# Patient Record
Sex: FEMALE | Race: BLACK OR AFRICAN AMERICAN | NOT HISPANIC OR LATINO | Employment: FULL TIME | URBAN - METROPOLITAN AREA
[De-identification: names, ages, dates, MRNs, and addresses within clinical notes are randomized per-mention and may not be internally consistent; named-entity substitution may affect disease eponyms.]

---

## 2019-02-08 ENCOUNTER — TELEPHONE (OUTPATIENT)
Dept: OBSTETRICS AND GYNECOLOGY | Age: 29
End: 2019-02-08

## 2019-04-01 ENCOUNTER — OFFICE VISIT (OUTPATIENT)
Dept: OBSTETRICS AND GYNECOLOGY | Age: 29
End: 2019-04-01

## 2019-04-01 VITALS
DIASTOLIC BLOOD PRESSURE: 90 MMHG | HEIGHT: 68 IN | SYSTOLIC BLOOD PRESSURE: 132 MMHG | BODY MASS INDEX: 44.41 KG/M2 | WEIGHT: 293 LBS

## 2019-04-01 DIAGNOSIS — E66.01 CLASS 3 SEVERE OBESITY DUE TO EXCESS CALORIES WITHOUT SERIOUS COMORBIDITY WITH BODY MASS INDEX (BMI) OF 45.0 TO 49.9 IN ADULT (HCC): ICD-10-CM

## 2019-04-01 DIAGNOSIS — Z11.3 SCREEN FOR STD (SEXUALLY TRANSMITTED DISEASE): ICD-10-CM

## 2019-04-01 DIAGNOSIS — Z72.0 TOBACCO ABUSE: ICD-10-CM

## 2019-04-01 DIAGNOSIS — L68.0 HIRSUTISM: ICD-10-CM

## 2019-04-01 DIAGNOSIS — N93.9 ABNORMAL UTERINE BLEEDING: Primary | ICD-10-CM

## 2019-04-01 DIAGNOSIS — Z12.4 SCREENING FOR MALIGNANT NEOPLASM OF CERVIX: ICD-10-CM

## 2019-04-01 DIAGNOSIS — E28.2 PCOS (POLYCYSTIC OVARIAN SYNDROME): ICD-10-CM

## 2019-04-01 DIAGNOSIS — Z00.00 HEALTHCARE MAINTENANCE: ICD-10-CM

## 2019-04-01 PROCEDURE — 99385 PREV VISIT NEW AGE 18-39: CPT | Performed by: OBSTETRICS & GYNECOLOGY

## 2019-04-01 PROCEDURE — 99213 OFFICE O/P EST LOW 20 MIN: CPT | Performed by: OBSTETRICS & GYNECOLOGY

## 2019-04-01 RX ORDER — ESCITALOPRAM OXALATE 10 MG/1
20 TABLET ORAL DAILY
COMMUNITY
End: 2019-08-20

## 2019-04-01 NOTE — ASSESSMENT & PLAN NOTE
I discussed with her the likely diagnosis of polycystic ovarian syndrome, I recommended lifestyle modifications including weight loss.  Recommended referral to dietitian today but she declines.  Also discussed oral contraceptives as a first-line treatment for PCOS and the rationale.  She declined starting oral contraceptive at this time.  She states we will get the labs today, will follow-up in about a month and will consider starting oral contraceptive

## 2019-04-01 NOTE — PROGRESS NOTES
Routine Annual Visit    2019    Patient: Franny Boudreaux          MR#:[id number]      Chief Complaint   Patient presents with   • Gynecologic Exam     New pt. last pap 2013 per pt. c/o abnormal bleeding        History of Present Illness    28 y.o. female  who presents for annual exam but also for abnormal uterine bleeding and hirsutism.  She knows that she will usually skip a couple of months and will have a heavy period that can last about a week.  More recently she has had more prolonged spotting during the months of February and March.  She also notes trouble some hair growing on her chin that she has to frequently remove.  She has gained a lot of weight recently.  She notes over the past year or so, she is gained about 60 pounds.  She is trying to exercise, but cannot seem to lose any weight.  She has not made significant dietary modifications    Health Maintenance  Gardasil yes  Last pap , normal per pt  Mammogram NA  Colonoscopy NA  PCP none currently    Patient's last menstrual period was 2019 (exact date).  Obstetric History:  OB History      Para Term  AB Living    0 0 0 0 0 0    SAB TAB Ectopic Molar Multiple Live Births    0 0 0 0 0 0         Menstrual History:     Patient's last menstrual period was 2019 (exact date).       Sexual History:     Not currently active  ________________________________________  Patient Active Problem List   Diagnosis   • Class 3 severe obesity due to excess calories without serious comorbidity in adult (CMS/HCC)   • Abnormal uterine bleeding   • Hirsutism   • PCOS (polycystic ovarian syndrome)   • Tobacco abuse       Past Medical History:   Diagnosis Date   • Abnormal vaginal bleeding    • Anxiety     lexapro by Jody   • Depression        Family History   Problem Relation Age of Onset   • Hypertension Maternal Grandmother    • Diabetes Maternal Grandmother    • Breast cancer Neg Hx    • Ovarian cancer Neg Hx    • Uterine cancer Neg Hx   "  • Colon cancer Neg Hx        Past Surgical History:   Procedure Laterality Date   • NO PAST SURGERIES         Social History     Tobacco Use   Smoking Status Current Every Day Smoker   • Packs/day: 0.25   • Years: 15.00   • Pack years: 3.75   • Types: Cigarettes   Smokeless Tobacco Never Used       has a current medication list which includes the following prescription(s): escitalopram, benzonatate, doxycycline, guaifenesin-codeine, and proair hfa.  ________________________________________    Current contraception: none  History of abnormal Pap smear: no  Family history of Breast, ovarian, uterine, colon, pancreatic cancer: no  History of abnormal mammogram: no    The following portions of the patient's history were reviewed and updated as appropriate: allergies, current medications, past family history, past medical history, past social history, past surgical history and problem list.    Review of Systems   Respiratory: Negative for chest tightness, shortness of breath and wheezing.    Cardiovascular: Negative for chest pain and palpitations.   Gastrointestinal: Positive for abdominal distention and constipation. Negative for abdominal pain and diarrhea.   Genitourinary: Positive for menstrual problem and vaginal discharge.   Neurological: Negative for dizziness and light-headedness.   Psychiatric/Behavioral: Positive for dysphoric mood. The patient is nervous/anxious.    no nipple discharge    Objective   Physical Exam    /90   Ht 172.7 cm (68\")   Wt (!) 142 kg (313 lb)   LMP 03/17/2019 (Exact Date)   Breastfeeding? No   BMI 47.59 kg/m²    BP Readings from Last 3 Encounters:   04/01/19 132/90   12/12/17 (!) 147/107      Wt Readings from Last 3 Encounters:   04/01/19 (!) 142 kg (313 lb)         BMI: Body mass index is 47.59 kg/m².       General:   alert, appears stated age and cooperative   skin Acanthosis nigricans on back of neck, also evidence of hair removal on chin   Heart:: regular rate and " rhythm, S1, S2 normal, no murmur, click, rub or gallop   Lungs: normal respiratory effort and auscultation   Abdomen: soft, non-tender, without masses or organomegaly and protuberant   Breast: inspection negative, no nipple discharge or bleeding, no masses or nodularity palpable   Urethra and bladder: urethral meatus normal; bladder nontender to palpation;   Vulva: normal, Bartholin's, Urethra, [place]'s normal   Vagina: normal mucosa, normal discharge   Cervix: no lesions,, slightly friable and some bleeding following pap   Uterus: normal size or anteverted   Adnexa: normal adnexa and no mass, fullness, tenderness       Assessment:    abnormal annual exam- obesity, hirsutism, friable cervix    Plan:    Plan     []  Mammogram request made  [x]  PAP done  [x]  Labs: HIV, Hep C due to limited prior screening and multiple tattoos  []  GC/Chl/TV  []  DEXA scan   []  Referral for colonoscopy:     Problems Addressed this Visit        Digestive    Class 3 severe obesity due to excess calories without serious comorbidity in adult (CMS/HCC)     Obesity is worsening.  Discussed the patient's BMI.  The BMI is above average; BMI management plan is completed.  General weight loss/lifestyle modification strategies discussed (elicit support from others; identify saboteurs; non-food rewards, etc).  Diet interventions: diet diary for the following my fitness pal vinay recommended.  Follow up in: 1 month and as needed.    Recommended referral to dietitician today, she declines            Endocrine    PCOS (polycystic ovarian syndrome)     I discussed with her the likely diagnosis of polycystic ovarian syndrome, I recommended lifestyle modifications including weight loss.  Recommended referral to dietitian today but she declines.  Also discussed oral contraceptives as a first-line treatment for PCOS and the rationale.  She declined starting oral contraceptive at this time.  She states we will get the labs today, will follow-up in about a  month and will consider starting oral contraceptive            Musculoskeletal and Integument    Hirsutism    Relevant Orders    17-Hydroxyprogesterone    DHEA-Sulfate    Hemoglobin A1c    Testosterone Free Direct       Genitourinary    Abnormal uterine bleeding - Primary    Relevant Orders    Prolactin    TSH    T4, Free    Lipid Panel    CBC & Differential    IGP, Rfx Aptima HPV ASCU       Other    Tobacco abuse     Not ready to quit at this time             Other Visit Diagnoses     Screen for STD (sexually transmitted disease)        Relevant Orders    Hepatitis C Antibody    HIV-1 / O / 2 Ag / Antibody 4th Generation    Healthcare maintenance        Relevant Orders    Ambulatory Referral to Family Practice    Screening for malignant neoplasm of cervix        Relevant Orders    IGP, Rfx Aptima HPV ASCU          Counseling  [x]  Nutrition  [x]  Physical activity/regular exercise   [x]  Healthy weight  []  Injury prevention  []  Smoking cessation  []  Substance misuse/abuse  []  Sexual behavior  [x]  STD prevention  [x]  Contraception  []  Dental health  []  Mental health  []  Immunization  []  Encouraged SBE      Patient's BMI is Body mass index is 47.59 kg/m²., which is classified as obesity. We discussed health consequences of obesity and recommended weight loss. I counseled regarding diet modifications and exercise in order to reach weight loss goal.     Kathia Klein MD  04/01/2019  1:48 PM

## 2019-04-02 LAB
CONV .: NORMAL
CYTOLOGIST CVX/VAG CYTO: NORMAL
CYTOLOGY CVX/VAG DOC THIN PREP: NORMAL
DX ICD CODE: NORMAL
HIV 1 & 2 AB SER-IMP: NORMAL
OTHER STN SPEC: NORMAL
PATH REPORT.FINAL DX SPEC: NORMAL
STAT OF ADQ CVX/VAG CYTO-IMP: NORMAL

## 2019-04-04 LAB
17OHP SERPL-MCNC: 75 NG/DL
BASOPHILS # BLD AUTO: 0.05 10*3/MM3 (ref 0–0.2)
BASOPHILS NFR BLD AUTO: 0.5 % (ref 0–1.5)
CHOLEST SERPL-MCNC: 202 MG/DL (ref 0–200)
DHEA-S SERPL-MCNC: 233.9 UG/DL (ref 84.8–378)
EOSINOPHIL # BLD AUTO: 0.17 10*3/MM3 (ref 0–0.4)
EOSINOPHIL NFR BLD AUTO: 1.8 % (ref 0.3–6.2)
ERYTHROCYTE [DISTWIDTH] IN BLOOD BY AUTOMATED COUNT: 14.2 % (ref 12.3–15.4)
HBA1C MFR BLD: 6.3 % (ref 4.8–5.6)
HCT VFR BLD AUTO: 40.8 % (ref 34–46.6)
HCV AB S/CO SERPL IA: <0.1 S/CO RATIO (ref 0–0.9)
HDLC SERPL-MCNC: 41 MG/DL (ref 40–60)
HGB BLD-MCNC: 12.7 G/DL (ref 12–15.9)
HIV 1+2 AB+HIV1 P24 AG SERPL QL IA: NON REACTIVE
IMM GRANULOCYTES # BLD AUTO: 0.03 10*3/MM3 (ref 0–0.05)
IMM GRANULOCYTES NFR BLD AUTO: 0.3 % (ref 0–0.5)
LDLC SERPL CALC-MCNC: 135 MG/DL (ref 0–100)
LYMPHOCYTES # BLD AUTO: 4.32 10*3/MM3 (ref 0.7–3.1)
LYMPHOCYTES NFR BLD AUTO: 46.3 % (ref 19.6–45.3)
MCH RBC QN AUTO: 30 PG (ref 26.6–33)
MCHC RBC AUTO-ENTMCNC: 31.1 G/DL (ref 31.5–35.7)
MCV RBC AUTO: 96.2 FL (ref 79–97)
MONOCYTES # BLD AUTO: 0.54 10*3/MM3 (ref 0.1–0.9)
MONOCYTES NFR BLD AUTO: 5.8 % (ref 5–12)
NEUTROPHILS # BLD AUTO: 4.23 10*3/MM3 (ref 1.4–7)
NEUTROPHILS NFR BLD AUTO: 45.3 % (ref 42.7–76)
NRBC BLD AUTO-RTO: 0 /100 WBC (ref 0–0)
PLATELET # BLD AUTO: 555 10*3/MM3 (ref 140–450)
PROLACTIN SERPL-MCNC: 26.6 NG/ML (ref 4.8–23.3)
RBC # BLD AUTO: 4.24 10*6/MM3 (ref 3.77–5.28)
T4 FREE SERPL-MCNC: 0.95 NG/DL (ref 0.93–1.7)
TESTOST FREE SERPL-MCNC: 3.4 PG/ML (ref 0–4.2)
TRIGL SERPL-MCNC: 129 MG/DL (ref 0–150)
TSH SERPL DL<=0.005 MIU/L-ACNC: 2.59 MIU/ML (ref 0.27–4.2)
VLDLC SERPL CALC-MCNC: 25.8 MG/DL
WBC # BLD AUTO: 9.34 10*3/MM3 (ref 3.4–10.8)

## 2019-04-26 ENCOUNTER — OFFICE VISIT (OUTPATIENT)
Dept: FAMILY MEDICINE CLINIC | Facility: CLINIC | Age: 29
End: 2019-04-26

## 2019-04-26 VITALS
OXYGEN SATURATION: 98 % | SYSTOLIC BLOOD PRESSURE: 98 MMHG | BODY MASS INDEX: 44.41 KG/M2 | HEIGHT: 68 IN | WEIGHT: 293 LBS | RESPIRATION RATE: 16 BRPM | DIASTOLIC BLOOD PRESSURE: 80 MMHG | HEART RATE: 84 BPM

## 2019-04-26 DIAGNOSIS — R06.83 SNORING: ICD-10-CM

## 2019-04-26 DIAGNOSIS — R53.82 CHRONIC FATIGUE: ICD-10-CM

## 2019-04-26 DIAGNOSIS — D75.839 THROMBOCYTOSIS: ICD-10-CM

## 2019-04-26 DIAGNOSIS — E78.5 HYPERLIPIDEMIA, UNSPECIFIED HYPERLIPIDEMIA TYPE: ICD-10-CM

## 2019-04-26 DIAGNOSIS — R73.03 PREDIABETES: Primary | ICD-10-CM

## 2019-04-26 PROCEDURE — 99204 OFFICE O/P NEW MOD 45 MIN: CPT | Performed by: FAMILY MEDICINE

## 2019-04-26 RX ORDER — METFORMIN HYDROCHLORIDE 500 MG/1
500 TABLET, EXTENDED RELEASE ORAL
Qty: 90 TABLET | Refills: 0 | Status: SHIPPED | OUTPATIENT
Start: 2019-04-26 | End: 2019-07-18 | Stop reason: SDUPTHER

## 2019-04-26 RX ORDER — RISPERIDONE 0.25 MG/1
0.25 TABLET ORAL 2 TIMES DAILY
COMMUNITY
End: 2019-08-20

## 2019-04-26 NOTE — PATIENT INSTRUCTIONS
"Follow up with your Ob/Gyn as scheduled on 5/2/19.     Try to eat small meals 3x per day. Try to incorporate 30 minutes of exercise when your schedule permits.     If you are experiencing more frequent heartburn, try using over the counter zantac (pepcid).     Please start metformin this weekend.     DASH Eating Plan  DASH stands for \"Dietary Approaches to Stop Hypertension.\" The DASH eating plan is a healthy eating plan that has been shown to reduce high blood pressure (hypertension). It may also reduce your risk for type 2 diabetes, heart disease, and stroke. The DASH eating plan may also help with weight loss.  What are tips for following this plan?  General guidelines  · Avoid eating more than 2,300 mg (milligrams) of salt (sodium) a day. If you have hypertension, you may need to reduce your sodium intake to 1,500 mg a day.  · Limit alcohol intake to no more than 1 drink a day for nonpregnant women and 2 drinks a day for men. One drink equals 12 oz of beer, 5 oz of wine, or 1½ oz of hard liquor.  · Work with your health care provider to maintain a healthy body weight or to lose weight. Ask what an ideal weight is for you.  · Get at least 30 minutes of exercise that causes your heart to beat faster (aerobic exercise) most days of the week. Activities may include walking, swimming, or biking.  · Work with your health care provider or diet and nutrition specialist (dietitian) to adjust your eating plan to your individual calorie needs.  Reading food labels  · Check food labels for the amount of sodium per serving. Choose foods with less than 5 percent of the Daily Value of sodium. Generally, foods with less than 300 mg of sodium per serving fit into this eating plan.  · To find whole grains, look for the word \"whole\" as the first word in the ingredient list.  Shopping  · Buy products labeled as \"low-sodium\" or \"no salt added.\"  · Buy fresh foods. Avoid canned foods and premade or frozen meals.  Cooking  · Avoid " adding salt when cooking. Use salt-free seasonings or herbs instead of table salt or sea salt. Check with your health care provider or pharmacist before using salt substitutes.  · Do not mcwilliams foods. Cook foods using healthy methods such as baking, boiling, grilling, and broiling instead.  · Cook with heart-healthy oils, such as olive, canola, soybean, or sunflower oil.  Meal planning    · Eat a balanced diet that includes:  ? 5 or more servings of fruits and vegetables each day. At each meal, try to fill half of your plate with fruits and vegetables.  ? Up to 6-8 servings of whole grains each day.  ? Less than 6 oz of lean meat, poultry, or fish each day. A 3-oz serving of meat is about the same size as a deck of cards. One egg equals 1 oz.  ? 2 servings of low-fat dairy each day.  ? A serving of nuts, seeds, or beans 5 times each week.  ? Heart-healthy fats. Healthy fats called Omega-3 fatty acids are found in foods such as flaxseeds and coldwater fish, like sardines, salmon, and mackerel.  · Limit how much you eat of the following:  ? Canned or prepackaged foods.  ? Food that is high in trans fat, such as fried foods.  ? Food that is high in saturated fat, such as fatty meat.  ? Sweets, desserts, sugary drinks, and other foods with added sugar.  ? Full-fat dairy products.  · Do not salt foods before eating.  · Try to eat at least 2 vegetarian meals each week.  · Eat more home-cooked food and less restaurant, buffet, and fast food.  · When eating at a restaurant, ask that your food be prepared with less salt or no salt, if possible.  What foods are recommended?  The items listed may not be a complete list. Talk with your dietitian about what dietary choices are best for you.  Grains  Whole-grain or whole-wheat bread. Whole-grain or whole-wheat pasta. Brown rice. Oatmeal. Quinoa. Bulgur. Whole-grain and low-sodium cereals. Susan bread. Low-fat, low-sodium crackers. Whole-wheat flour tortillas.  Vegetables  Fresh or  frozen vegetables (raw, steamed, roasted, or grilled). Low-sodium or reduced-sodium tomato and vegetable juice. Low-sodium or reduced-sodium tomato sauce and tomato paste. Low-sodium or reduced-sodium canned vegetables.  Fruits  All fresh, dried, or frozen fruit. Canned fruit in natural juice (without added sugar).  Meat and other protein foods  Skinless chicken or turkey. Ground chicken or turkey. Pork with fat trimmed off. Fish and seafood. Egg whites. Dried beans, peas, or lentils. Unsalted nuts, nut butters, and seeds. Unsalted canned beans. Lean cuts of beef with fat trimmed off. Low-sodium, lean deli meat.  Dairy  Low-fat (1%) or fat-free (skim) milk. Fat-free, low-fat, or reduced-fat cheeses. Nonfat, low-sodium ricotta or cottage cheese. Low-fat or nonfat yogurt. Low-fat, low-sodium cheese.  Fats and oils  Soft margarine without trans fats. Vegetable oil. Low-fat, reduced-fat, or light mayonnaise and salad dressings (reduced-sodium). Canola, safflower, olive, soybean, and sunflower oils. Avocado.  Seasoning and other foods  Herbs. Spices. Seasoning mixes without salt. Unsalted popcorn and pretzels. Fat-free sweets.  What foods are not recommended?  The items listed may not be a complete list. Talk with your dietitian about what dietary choices are best for you.  Grains  Baked goods made with fat, such as croissants, muffins, or some breads. Dry pasta or rice meal packs.  Vegetables  Creamed or fried vegetables. Vegetables in a cheese sauce. Regular canned vegetables (not low-sodium or reduced-sodium). Regular canned tomato sauce and paste (not low-sodium or reduced-sodium). Regular tomato and vegetable juice (not low-sodium or reduced-sodium). Pickles. Olives.  Fruits  Canned fruit in a light or heavy syrup. Fried fruit. Fruit in cream or butter sauce.  Meat and other protein foods  Fatty cuts of meat. Ribs. Fried meat. Rehman. Sausage. Bologna and other processed lunch meats. Salami. Fatback. Hotdogs.  Bratwurst. Salted nuts and seeds. Canned beans with added salt. Canned or smoked fish. Whole eggs or egg yolks. Chicken or turkey with skin.  Dairy  Whole or 2% milk, cream, and half-and-half. Whole or full-fat cream cheese. Whole-fat or sweetened yogurt. Full-fat cheese. Nondairy creamers. Whipped toppings. Processed cheese and cheese spreads.  Fats and oils  Butter. Stick margarine. Lard. Shortening. Ghee. Rehman fat. Tropical oils, such as coconut, palm kernel, or palm oil.  Seasoning and other foods  Salted popcorn and pretzels. Onion salt, garlic salt, seasoned salt, table salt, and sea salt. Worcestershire sauce. Tartar sauce. Barbecue sauce. Teriyaki sauce. Soy sauce, including reduced-sodium. Steak sauce. Canned and packaged gravies. Fish sauce. Oyster sauce. Cocktail sauce. Horseradish that you find on the shelf. Ketchup. Mustard. Meat flavorings and tenderizers. Bouillon cubes. Hot sauce and Tabasco sauce. Premade or packaged marinades. Premade or packaged taco seasonings. Relishes. Regular salad dressings.  Where to find more information:  · National Heart, Lung, and Blood [place]: www.nhlbi.nih.gov  · American Heart Association: www.heart.org  Summary  · The DASH eating plan is a healthy eating plan that has been shown to reduce high blood pressure (hypertension). It may also reduce your risk for type 2 diabetes, heart disease, and stroke.  · With the DASH eating plan, you should limit salt (sodium) intake to 2,300 mg a day. If you have hypertension, you may need to reduce your sodium intake to 1,500 mg a day.  · When on the DASH eating plan, aim to eat more fresh fruits and vegetables, whole grains, lean proteins, low-fat dairy, and heart-healthy fats.  · Work with your health care provider or diet and nutrition specialist (dietitian) to adjust your eating plan to your individual calorie needs.  This information is not intended to replace advice given to you by your health care provider. Make sure you  discuss any questions you have with your health care provider.  Document Released: 12/06/2012 Document Revised: 12/11/2017 Document Reviewed: 12/11/2017  [employer] Interactive Patient Education © 2019 [employer]      Cholesterol  Cholesterol is a fat. Your body needs a small amount of cholesterol. Cholesterol (plaque) may build up in your blood vessels (arteries). That makes you more likely to have a heart attack or stroke.  You cannot feel your cholesterol level. Having a blood test is the only way to find out if your level is high. Keep your test results. Work with your doctor to keep your cholesterol at a good level.  What do the results mean?  · Total cholesterol is how much cholesterol is in your blood.  · LDL is bad cholesterol. This is the type that can build up. Try to have low LDL.  · HDL is good cholesterol. It cleans your blood vessels and carries LDL away. Try to have high HDL.  · Triglycerides are fat that the body can store or burn for energy.  What are good levels of cholesterol?  · Total cholesterol below 200.  · LDL below 100 is good for people who have health risks. LDL below 70 is good for people who have very high risks.  · HDL above 40 is good. It is best to have HDL of 60 or higher.  · Triglycerides below 150.  How can I lower my cholesterol?  Diet  Follow your diet program as told by your doctor.  · Choose fish, white meat chicken, or turkey that is roasted or baked. Try not to eat red meat, fried foods, sausage, or lunch meats.  · Eat lots of fresh fruits and vegetables.  · Choose whole grains, beans, pasta, potatoes, and cereals.  · Choose olive oil, corn oil, or canola oil. Only use small amounts.  · Try not to eat butter, mayonnaise, shortening, or palm kernel oils.  · Try not to eat foods with trans fats.  · Choose low-fat or nonfat dairy foods.  ? Drink skim or nonfat milk.  ? Eat low-fat or nonfat yogurt and cheeses.  ? Try not to drink whole milk or cream.  ? Try not to eat ice  cream, egg yolks, or full-fat cheeses.  · Healthy desserts include kenzie food cake, francisca snaps, animal crackers, hard candy, popsicles, and low-fat or nonfat frozen yogurt. Try not to eat pastries, cakes, pies, and cookies.    Exercise  Follow your exercise program as told by your doctor.  · Be more active. Try gardening, walking, and taking the stairs.  · Ask your doctor about ways that you can be more active.    Medicine  · Take over-the-counter and prescription medicines only as told by your doctor.  This information is not intended to replace advice given to you by your health care provider. Make sure you discuss any questions you have with your health care provider.  Document Released: 03/16/2010 Document Revised: 07/19/2017 Document Reviewed: 06/29/2017  [employer] Interactive Patient Education © 2019 [employer]      Food Choices for Gastroesophageal Reflux Disease, Adult  When you have gastroesophageal reflux disease (GERD), the foods you eat and your eating habits are very important. Choosing the right foods can help ease your discomfort. Think about working with a nutrition specialist (dietitian) to help you make good choices.  What are tips for following this plan?  Meals  · Choose healthy foods that are low in fat, such as fruits, vegetables, whole grains, low-fat dairy products, and lean meat, fish, and poultry.  · Eat small meals often instead of 3 large meals a day. Eat your meals slowly, and in a place where you are relaxed. Avoid bending over or lying down until 2-3 hours after eating.  · Avoid eating meals 2-3 hours before bed.  · Avoid drinking a lot of liquid with meals.  · Cook foods using methods other than frying. Bake, grill, or broil food instead.  · Avoid or limit:  ? Chocolate.  ? Peppermint or spearmint.  ? Alcohol.  ? Pepper.  ? Black and decaffeinated coffee.  ? Black and decaffeinated tea.  ? Bubbly (carbonated) soft drinks.  ? Caffeinated energy drinks and soft drinks.  · Limit  high-fat foods such as:  ? Fatty meat or fried foods.  ? Whole milk, cream, butter, or ice cream.  ? Nuts and nut butters.  ? Pastries, donuts, and sweets made with butter or shortening.  · Avoid foods that cause symptoms. These foods may be different for everyone. Common foods that cause symptoms include:  ? Tomatoes.  ? Oranges, arline, and limes.  ? Peppers.  ? Spicy food.  ? Onions and garlic.  ? Vinegar.  Lifestyle    · Maintain a healthy weight. Ask your doctor what weight is healthy for you. If you need to lose weight, work with your doctor to do so safely.  · Exercise for at least 30 minutes for 5 or more days each week, or as told by your doctor.  · Wear loose-fitting clothes.  · Do not smoke. If you need help quitting, ask your doctor.  · Sleep with the head of your bed higher than your feet. Use a wedge under the mattress or blocks under the bed frame to raise the head of the bed.  Summary  · When you have gastroesophageal reflux disease (GERD), food and lifestyle choices are very important in easing your symptoms.  · Eat small meals often instead of 3 large meals a day. Eat your meals slowly, and in a place where you are relaxed.  · Limit high-fat foods such as fatty meat or fried foods.  · Avoid bending over or lying down until 2-3 hours after eating.  · Avoid peppermint and spearmint, caffeine, alcohol, and chocolate.  This information is not intended to replace advice given to you by your health care provider. Make sure you discuss any questions you have with your health care provider.  Document Released: 06/18/2013 Document Revised: 01/23/2018 Document Reviewed: 01/23/2018  [employer] Interactive Patient Education © 2019 [employer]

## 2019-04-26 NOTE — PROGRESS NOTES
"Subjective   Franny Boudreaux is a 28 y.o. female.     Chief Complaint   Patient presents with   • Establish Care   • Fatigue   • Abnormal Lab     Patient is establishing care today. She has not been to a primary care doctor in several years and was recently referred to this office by her OB/GYN when routine labs returned abnormal.      Prediabetes:  Recent HgB A1C elevated at 6.3% on 4/1/19.  Her mother and maternal grandmother both have type 2 diabetes.  Patient works a sedentary job and she does not exercise at this time. She notes that she eats a lot of fried food because it \"fills her up.\"     Labs dated 4/1/19 also notable for :  -Hyperlipidemia, Lipid panel with LDL at 135, HDL at 41, triglycerides at 129, and TC at 202  -Prolactin was elevated at 26.6 (followed by OB/GYN)  -Platelets were elevated at 555    Chronic fatigue:  Gradually worsening over the last few years.  Recent thyroid labs were normal. Her sister has told her that she snores every night.  Pt fears that she may have narcolepsy since she has experienced \"sleep attacks\" at inappropriate times. Patient would like a referral to sleep medicine.       PMH also notable for     GERD:  Patient reports heartburn after eating about 1 time per week. She states that her heartburn is well controlled with Tums and li seltzer PRN.       Mental Health History:  Patient currently takes escitalopram 10 mg/daily and risperidone 0.25 mg/PRN insomnia. She states that these medications help to stabilize her mood and help her sleep. She is followed by psychiatry, Dr. Ann-Marie Velazquez and as well as a therapist.        Review of Systems   Constitutional: Positive for fatigue (daytime). Negative for activity change, appetite change and unexpected weight change.   HENT: Negative for congestion, ear pain, sinus pain and tinnitus.    Eyes: Negative for pain and visual disturbance.   Respiratory: Positive for apnea (snoring). Negative for cough, chest tightness, shortness " of breath and wheezing.    Cardiovascular: Negative for chest pain and palpitations.   Gastrointestinal: Positive for hemorrhoids. Negative for abdominal pain, constipation, diarrhea, nausea and vomiting.   Genitourinary: Positive for menstrual problem (irregular). Negative for dysuria and hematuria.   Musculoskeletal: Negative for arthralgias, back pain and myalgias.   Skin: Negative for rash.   Allergic/Immunologic: Negative for environmental allergies and food allergies.   Neurological: Negative for dizziness, weakness and headaches.   Psychiatric/Behavioral: Positive for dysphoric mood and sleep disturbance. The patient is nervous/anxious.        Past Medical History:   Diagnosis Date   • Abnormal vaginal bleeding    • Anxiety     followed by Psychiatrist and Therapist   • Depression    • GERD (gastroesophageal reflux disease)    • Hyperlipidemia    • Prediabetes    • Thrombocytosis (CMS/HCC)        Past Surgical History:   Procedure Laterality Date   • WISDOM TOOTH EXTRACTION         Family History   Problem Relation Age of Onset   • Hypertension Maternal Grandmother    • Diabetes Maternal Grandmother    • Heart disease Maternal Grandmother    • Hypertension Mother    • Diabetes Mother        Social History     Tobacco Use   • Smoking status: Current Every Day Smoker     Packs/day: 0.25     Years: 15.00     Pack years: 3.75     Types: Cigarettes   • Smokeless tobacco: Never Used   Substance Use Topics   • Alcohol use: Yes     Comment: 1 drink/month   • Drug use: Yes     Types: Marijuana     Social History     Social History Narrative    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  Lives at home with her sister.        She states that she has been smoking 0.5 pack/day for the last 14 years. She is not actively trying to quit at this time    No Known Allergies     Outpatient Medications Prior to Visit   Medication Sig Dispense Refill   • escitalopram (LEXAPRO) 10 MG tablet Take 20 mg by mouth Daily.      • risperiDONE (risperDAL) 0.25 MG tablet Take 0.25 mg by mouth 2 (Two) Times a Day.       No facility-administered medications prior to visit.        Objective     Vitals:    04/26/19 1407   BP: 98/80   Pulse: 84   Resp: 16   SpO2: 98%       Physical Exam   Constitutional: She appears well-developed and well-nourished.   BMI at 48.2   HENT:   Head: Normocephalic and atraumatic.   Mouth/Throat: Tonsils are 2+ on the right. Tonsils are 2+ on the left.   Eyes: Conjunctivae and EOM are normal. Pupils are equal, round, and reactive to light.   Cardiovascular: Normal rate and regular rhythm. Exam reveals no gallop and no friction rub.   No murmur heard.  Pulmonary/Chest: Effort normal and breath sounds normal. No respiratory distress. She has no wheezes. She has no rales.   Abdominal: Soft. Bowel sounds are normal. She exhibits no distension. There is no tenderness.   Neurological: She has normal strength. Gait normal.   Skin: Skin is warm and dry.   Psychiatric: She has a normal mood and affect.   Vitals reviewed.      ASSESSMENT/PLAN       Problem List Items Addressed This Visit        Cardiovascular and Mediastinum    Hyperlipidemia  Pt counseled on diet x 20 minutes  Pt advised to exercise 3 x per week for 30 min per session       Other    Prediabetes - Primary    Relevant Medications    start metFORMIN ER (GLUCOPHAGE-XR) 500 MG 24 hr tablet/day  Plan for repeat A1C in 3 months      Other Visit Diagnoses     Thrombocytosis (CMS/HCC)        Relevant Orders    Repeat CBC w MANUAL Differential    Snoring        Relevant Orders    Ambulatory Referral to Sleep Medicine    Chronic fatigue        Relevant Orders    Ambulatory Referral to Sleep Medicine            Patient Instructions   Follow up with your Ob/Gyn as scheduled on 5/2/19.     Try to eat small meals 3x per day. Try to incorporate 30 minutes of exercise when your schedule permits.     If you are experiencing more frequent heartburn, try using over the counter  "zantac (pepcid).     Please start metformin this weekend.     DASH Eating Plan  DASH stands for \"Dietary Approaches to Stop Hypertension.\" The DASH eating plan is a healthy eating plan that has been shown to reduce high blood pressure (hypertension). It may also reduce your risk for type 2 diabetes, heart disease, and stroke. The DASH eating plan may also help with weight loss.  What are tips for following this plan?  General guidelines  · Avoid eating more than 2,300 mg (milligrams) of salt (sodium) a day. If you have hypertension, you may need to reduce your sodium intake to 1,500 mg a day.  · Limit alcohol intake to no more than 1 drink a day for nonpregnant women and 2 drinks a day for men. One drink equals 12 oz of beer, 5 oz of wine, or 1½ oz of hard liquor.  · Work with your health care provider to maintain a healthy body weight or to lose weight. Ask what an ideal weight is for you.  · Get at least 30 minutes of exercise that causes your heart to beat faster (aerobic exercise) most days of the week. Activities may include walking, swimming, or biking.  · Work with your health care provider or diet and nutrition specialist (dietitian) to adjust your eating plan to your individual calorie needs.  Reading food labels  · Check food labels for the amount of sodium per serving. Choose foods with less than 5 percent of the Daily Value of sodium. Generally, foods with less than 300 mg of sodium per serving fit into this eating plan.  · To find whole grains, look for the word \"whole\" as the first word in the ingredient list.  Shopping  · Buy products labeled as \"low-sodium\" or \"no salt added.\"  · Buy fresh foods. Avoid canned foods and premade or frozen meals.  Cooking  · Avoid adding salt when cooking. Use salt-free seasonings or herbs instead of table salt or sea salt. Check with your health care provider or pharmacist before using salt substitutes.  · Do not mcwilliams foods. Cook foods using healthy methods such as " baking, boiling, grilling, and broiling instead.  · Cook with heart-healthy oils, such as olive, canola, soybean, or sunflower oil.  Meal planning    · Eat a balanced diet that includes:  ? 5 or more servings of fruits and vegetables each day. At each meal, try to fill half of your plate with fruits and vegetables.  ? Up to 6-8 servings of whole grains each day.  ? Less than 6 oz of lean meat, poultry, or fish each day. A 3-oz serving of meat is about the same size as a deck of cards. One egg equals 1 oz.  ? 2 servings of low-fat dairy each day.  ? A serving of nuts, seeds, or beans 5 times each week.  ? Heart-healthy fats. Healthy fats called Omega-3 fatty acids are found in foods such as flaxseeds and coldwater fish, like sardines, salmon, and mackerel.  · Limit how much you eat of the following:  ? Canned or prepackaged foods.  ? Food that is high in trans fat, such as fried foods.  ? Food that is high in saturated fat, such as fatty meat.  ? Sweets, desserts, sugary drinks, and other foods with added sugar.  ? Full-fat dairy products.  · Do not salt foods before eating.  · Try to eat at least 2 vegetarian meals each week.  · Eat more home-cooked food and less restaurant, buffet, and fast food.  · When eating at a restaurant, ask that your food be prepared with less salt or no salt, if possible.  What foods are recommended?  The items listed may not be a complete list. Talk with your dietitian about what dietary choices are best for you.  Grains  Whole-grain or whole-wheat bread. Whole-grain or whole-wheat pasta. Brown rice. Oatmeal. Quinoa. Bulgur. Whole-grain and low-sodium cereals. Susan bread. Low-fat, low-sodium crackers. Whole-wheat flour tortillas.  Vegetables  Fresh or frozen vegetables (raw, steamed, roasted, or grilled). Low-sodium or reduced-sodium tomato and vegetable juice. Low-sodium or reduced-sodium tomato sauce and tomato paste. Low-sodium or reduced-sodium canned vegetables.  Fruits  All fresh,  dried, or frozen fruit. Canned fruit in natural juice (without added sugar).  Meat and other protein foods  Skinless chicken or turkey. Ground chicken or turkey. Pork with fat trimmed off. Fish and seafood. Egg whites. Dried beans, peas, or lentils. Unsalted nuts, nut butters, and seeds. Unsalted canned beans. Lean cuts of beef with fat trimmed off. Low-sodium, lean deli meat.  Dairy  Low-fat (1%) or fat-free (skim) milk. Fat-free, low-fat, or reduced-fat cheeses. Nonfat, low-sodium ricotta or cottage cheese. Low-fat or nonfat yogurt. Low-fat, low-sodium cheese.  Fats and oils  Soft margarine without trans fats. Vegetable oil. Low-fat, reduced-fat, or light mayonnaise and salad dressings (reduced-sodium). Canola, safflower, olive, soybean, and sunflower oils. Avocado.  Seasoning and other foods  Herbs. Spices. Seasoning mixes without salt. Unsalted popcorn and pretzels. Fat-free sweets.  What foods are not recommended?  The items listed may not be a complete list. Talk with your dietitian about what dietary choices are best for you.  Grains  Baked goods made with fat, such as croissants, muffins, or some breads. Dry pasta or rice meal packs.  Vegetables  Creamed or fried vegetables. Vegetables in a cheese sauce. Regular canned vegetables (not low-sodium or reduced-sodium). Regular canned tomato sauce and paste (not low-sodium or reduced-sodium). Regular tomato and vegetable juice (not low-sodium or reduced-sodium). Pickles. Olives.  Fruits  Canned fruit in a light or heavy syrup. Fried fruit. Fruit in cream or butter sauce.  Meat and other protein foods  Fatty cuts of meat. Ribs. Fried meat. Rehman. Sausage. Bologna and other processed lunch meats. Salami. Fatback. Hotdogs. Bratwurst. Salted nuts and seeds. Canned beans with added salt. Canned or smoked fish. Whole eggs or egg yolks. Chicken or turkey with skin.  Dairy  Whole or 2% milk, cream, and half-and-half. Whole or full-fat cream cheese. Whole-fat or sweetened  yogurt. Full-fat cheese. Nondairy creamers. Whipped toppings. Processed cheese and cheese spreads.  Fats and oils  Butter. Stick margarine. Lard. Shortening. Ghee. Rehman fat. Tropical oils, such as coconut, palm kernel, or palm oil.  Seasoning and other foods  Salted popcorn and pretzels. Onion salt, garlic salt, seasoned salt, table salt, and sea salt. Worcestershire sauce. Tartar sauce. Barbecue sauce. Teriyaki sauce. Soy sauce, including reduced-sodium. Steak sauce. Canned and packaged gravies. Fish sauce. Oyster sauce. Cocktail sauce. Horseradish that you find on the shelf. Ketchup. Mustard. Meat flavorings and tenderizers. Bouillon cubes. Hot sauce and Tabasco sauce. Premade or packaged marinades. Premade or packaged taco seasonings. Relishes. Regular salad dressings.  Where to find more information:  · National Heart, Lung, and Blood [place]: www.nhlbi.nih.gov  · American Heart Association: www.heart.org  Summary  · The DASH eating plan is a healthy eating plan that has been shown to reduce high blood pressure (hypertension). It may also reduce your risk for type 2 diabetes, heart disease, and stroke.  · With the DASH eating plan, you should limit salt (sodium) intake to 2,300 mg a day. If you have hypertension, you may need to reduce your sodium intake to 1,500 mg a day.  · When on the DASH eating plan, aim to eat more fresh fruits and vegetables, whole grains, lean proteins, low-fat dairy, and heart-healthy fats.  · Work with your health care provider or diet and nutrition specialist (dietitian) to adjust your eating plan to your individual calorie needs.  This information is not intended to replace advice given to you by your health care provider. Make sure you discuss any questions you have with your health care provider.  Document Released: 12/06/2012 Document Revised: 12/11/2017 Document Reviewed: 12/11/2017  [employer] Interactive Patient Education © 2019 [employer]      Cholesterol  Cholesterol is a  fat. Your body needs a small amount of cholesterol. Cholesterol (plaque) may build up in your blood vessels (arteries). That makes you more likely to have a heart attack or stroke.  You cannot feel your cholesterol level. Having a blood test is the only way to find out if your level is high. Keep your test results. Work with your doctor to keep your cholesterol at a good level.  What do the results mean?  · Total cholesterol is how much cholesterol is in your blood.  · LDL is bad cholesterol. This is the type that can build up. Try to have low LDL.  · HDL is good cholesterol. It cleans your blood vessels and carries LDL away. Try to have high HDL.  · Triglycerides are fat that the body can store or burn for energy.  What are good levels of cholesterol?  · Total cholesterol below 200.  · LDL below 100 is good for people who have health risks. LDL below 70 is good for people who have very high risks.  · HDL above 40 is good. It is best to have HDL of 60 or higher.  · Triglycerides below 150.  How can I lower my cholesterol?  Diet  Follow your diet program as told by your doctor.  · Choose fish, white meat chicken, or turkey that is roasted or baked. Try not to eat red meat, fried foods, sausage, or lunch meats.  · Eat lots of fresh fruits and vegetables.  · Choose whole grains, beans, pasta, potatoes, and cereals.  · Choose olive oil, corn oil, or canola oil. Only use small amounts.  · Try not to eat butter, mayonnaise, shortening, or palm kernel oils.  · Try not to eat foods with trans fats.  · Choose low-fat or nonfat dairy foods.  ? Drink skim or nonfat milk.  ? Eat low-fat or nonfat yogurt and cheeses.  ? Try not to drink whole milk or cream.  ? Try not to eat ice cream, egg yolks, or full-fat cheeses.  · Healthy desserts include kenzie food cake, francisca snaps, animal crackers, hard candy, popsicles, and low-fat or nonfat frozen yogurt. Try not to eat pastries, cakes, pies, and cookies.    Exercise  Follow your  exercise program as told by your doctor.  · Be more active. Try gardening, walking, and taking the stairs.  · Ask your doctor about ways that you can be more active.    Medicine  · Take over-the-counter and prescription medicines only as told by your doctor.  This information is not intended to replace advice given to you by your health care provider. Make sure you discuss any questions you have with your health care provider.  Document Released: 03/16/2010 Document Revised: 07/19/2017 Document Reviewed: 06/29/2017  [employer] Interactive Patient Education © 2019 [employer]      Food Choices for Gastroesophageal Reflux Disease, Adult  When you have gastroesophageal reflux disease (GERD), the foods you eat and your eating habits are very important. Choosing the right foods can help ease your discomfort. Think about working with a nutrition specialist (dietitian) to help you make good choices.  What are tips for following this plan?  Meals  · Choose healthy foods that are low in fat, such as fruits, vegetables, whole grains, low-fat dairy products, and lean meat, fish, and poultry.  · Eat small meals often instead of 3 large meals a day. Eat your meals slowly, and in a place where you are relaxed. Avoid bending over or lying down until 2-3 hours after eating.  · Avoid eating meals 2-3 hours before bed.  · Avoid drinking a lot of liquid with meals.  · Cook foods using methods other than frying. Bake, grill, or broil food instead.  · Avoid or limit:  ? Chocolate.  ? Peppermint or spearmint.  ? Alcohol.  ? Pepper.  ? Black and decaffeinated coffee.  ? Black and decaffeinated tea.  ? Bubbly (carbonated) soft drinks.  ? Caffeinated energy drinks and soft drinks.  · Limit high-fat foods such as:  ? Fatty meat or fried foods.  ? Whole milk, cream, butter, or ice cream.  ? Nuts and nut butters.  ? Pastries, donuts, and sweets made with butter or shortening.  · Avoid foods that cause symptoms. These foods may be different for  everyone. Common foods that cause symptoms include:  ? Tomatoes.  ? Oranges, arline, and limes.  ? Peppers.  ? Spicy food.  ? Onions and garlic.  ? Vinegar.  Lifestyle    · Maintain a healthy weight. Ask your doctor what weight is healthy for you. If you need to lose weight, work with your doctor to do so safely.  · Exercise for at least 30 minutes for 5 or more days each week, or as told by your doctor.  · Wear loose-fitting clothes.  · Do not smoke. If you need help quitting, ask your doctor.  · Sleep with the head of your bed higher than your feet. Use a wedge under the mattress or blocks under the bed frame to raise the head of the bed.  Summary  · When you have gastroesophageal reflux disease (GERD), food and lifestyle choices are very important in easing your symptoms.  · Eat small meals often instead of 3 large meals a day. Eat your meals slowly, and in a place where you are relaxed.  · Limit high-fat foods such as fatty meat or fried foods.  · Avoid bending over or lying down until 2-3 hours after eating.  · Avoid peppermint and spearmint, caffeine, alcohol, and chocolate.  This information is not intended to replace advice given to you by your health care provider. Make sure you discuss any questions you have with your health care provider.  Document Released: 06/18/2013 Document Revised: 01/23/2018 Document Reviewed: 01/23/2018  [employer] Interactive Patient Education © 2019 [employer]      Return in about 3 months (around 7/26/2019) for Recheck diabetes.       I,Nilda Rivera, am scribing for, and in the presence of,Katlin Britton MD. 4/26/2019 2:59 PM    I, Katlin Britton MD   personally, performed the services described in this documentation, as scribed by,Nilda Rivera, in my presence, and it is both accurate and complete.    Katlin Britton MD  04/28/19.  6:09 PM

## 2019-04-27 LAB
BASOPHILS # BLD AUTO: 0.1 X10E3/UL (ref 0–0.2)
BASOPHILS NFR BLD AUTO: 1 %
EOSINOPHIL # BLD AUTO: 0.1 X10E3/UL (ref 0–0.4)
EOSINOPHIL NFR BLD AUTO: 1 %
ERYTHROCYTE [DISTWIDTH] IN BLOOD BY AUTOMATED COUNT: 14.2 % (ref 12.3–15.4)
HCT VFR BLD AUTO: 39.5 % (ref 34–46.6)
HGB BLD-MCNC: 12.9 G/DL (ref 11.1–15.9)
IMM GRANULOCYTES # BLD AUTO: 0 X10E3/UL (ref 0–0.1)
IMM GRANULOCYTES NFR BLD AUTO: 0 %
LYMPHOCYTES # BLD AUTO: 4.9 X10E3/UL (ref 0.7–3.1)
LYMPHOCYTES NFR BLD AUTO: 46 %
MCH RBC QN AUTO: 29.9 PG (ref 26.6–33)
MCHC RBC AUTO-ENTMCNC: 32.7 G/DL (ref 31.5–35.7)
MCV RBC AUTO: 92 FL (ref 79–97)
MONOCYTES # BLD AUTO: 0.6 X10E3/UL (ref 0.1–0.9)
MONOCYTES NFR BLD AUTO: 5 %
NEUTROPHILS # BLD AUTO: 5 X10E3/UL (ref 1.4–7)
NEUTROPHILS NFR BLD AUTO: 47 %
PLATELET # BLD AUTO: 608 X10E3/UL (ref 150–379)
RBC # BLD AUTO: 4.31 X10E6/UL (ref 3.77–5.28)
WBC # BLD AUTO: 10.7 X10E3/UL (ref 3.4–10.8)

## 2019-04-30 DIAGNOSIS — D75.839 THROMBOCYTOSIS: Primary | ICD-10-CM

## 2019-05-13 ENCOUNTER — APPOINTMENT (OUTPATIENT)
Dept: SLEEP MEDICINE | Facility: HOSPITAL | Age: 29
End: 2019-05-13

## 2019-06-06 ENCOUNTER — APPOINTMENT (OUTPATIENT)
Dept: ONCOLOGY | Facility: CLINIC | Age: 29
End: 2019-06-06

## 2019-06-06 ENCOUNTER — APPOINTMENT (OUTPATIENT)
Dept: LAB | Facility: HOSPITAL | Age: 29
End: 2019-06-06

## 2019-06-24 ENCOUNTER — OFFICE VISIT (OUTPATIENT)
Dept: SLEEP MEDICINE | Facility: HOSPITAL | Age: 29
End: 2019-06-24

## 2019-06-24 VITALS
SYSTOLIC BLOOD PRESSURE: 140 MMHG | BODY MASS INDEX: 45.99 KG/M2 | HEART RATE: 88 BPM | DIASTOLIC BLOOD PRESSURE: 80 MMHG | OXYGEN SATURATION: 99 % | HEIGHT: 67 IN | WEIGHT: 293 LBS

## 2019-06-24 DIAGNOSIS — R06.83 SNORING: ICD-10-CM

## 2019-06-24 DIAGNOSIS — G47.10 HYPERSOMNIA: Primary | ICD-10-CM

## 2019-06-24 DIAGNOSIS — E66.01 MORBID OBESITY (HCC): ICD-10-CM

## 2019-06-24 PROCEDURE — G0463 HOSPITAL OUTPT CLINIC VISIT: HCPCS

## 2019-06-24 NOTE — PROGRESS NOTES
[facility]  Telephone: [phone] / Fax: [phone] [place]  Telephone: [phone] / Fax: [phone] [place]    Referring Physician:  Katlin Britton MD   PCP: Katlin Britton MD    Reason for consult:  sleep apnea    Franny Boudreaux is a 28 y.o.female  was seen in the Sleep Disorders Center today for evaluation of sleep apnea.  She reports EDS snoring and gasping respirations ongoing for the past 3 years.  Her weight has increased from 280 lbs to 316 lbs in past few years.  She has recurrent interruptions from sleep and does not have a set sleep schedule.  She sleeps from 10-11 PM until 6 AM.  She frequently takes naps during the day.  She feels excessively sleepy during the day and has sudden episodes of sleep during the day.  At times she notices that her muscles go week when she laughs or gets angry.    Social history, , smokes 5 cigars a day.  Has been a smoker since age 14.  Previously smoked marijuana.    Review of systems, anxiety, depression, frequent heartburn.  Anxiety is controlled on medications.    [place] sleepiness score is 18.      Franny Boudreaux  has a past medical history of Abnormal vaginal bleeding, Anxiety, Depression, GERD (gastroesophageal reflux disease), History of snoring, Hyperlipidemia, Prediabetes, and Thrombocytosis (CMS/HCC).    Current Medications:    Current Outpatient Medications:   •  escitalopram (LEXAPRO) 10 MG tablet, Take 20 mg by mouth Daily., Disp: , Rfl:   •  metFORMIN ER (GLUCOPHAGE-XR) 500 MG 24 hr tablet, Take 1 tablet by mouth Daily With Breakfast., Disp: 90 tablet, Rfl: 0  •  risperiDONE (risperDAL) 0.25 MG tablet, Take 0.25 mg by mouth 2 (Two) Times a Day., Disp: , Rfl:     I have reviewed Past Medical History, Past Surgical History, Medication List, Social History and Family History as entered in Sleep Questionnaire and EPIC.    ESS  18   Vital Signs /80 (BP Location: Left arm,  "Patient Position: Sitting)   Pulse 88   Ht 170.2 cm (67\")   Wt (!) 144 kg (316 lb 6.4 oz)   SpO2 99%   BMI 49.56 kg/m²  Body mass index is 49.56 kg/m².    General Alert and oriented. No acute distress noted   Pharynx/Throat Class IV Mallampati airway, large tongue, no evidence of redundant lateral pharyngeal tissue. No oral lesions. No thrush. Moist mucous membranes.   Head Normocephalic. Symmetrical. Atraumatic.    Nose No septal deviation. No drainage   Chest Wall Normal shape. Symmetric expansion with respiration. No tenderness.   Neck Trachea midline, no thyromegaly or adenopathy    Lungs Clear to auscultation bilaterally. No wheezes. No rhonchi. No rales. Respirations regular, even and unlabored.   Heart Regular rhythm and normal rate. Normal S1 and S2. No murmur   Abdomen Soft, non-tender and non-distended. Normal bowel sounds. No masses.   Extremities Moves all extremities well. No edema   Psychiatric Normal mood and affect.         Impression:  1. Hypersomnia    2. Morbid obesity (CMS/HCC)    3. Snoring          Plan:  I discussed the pathophysiology of obstructive sleep apnea with the patient.  We discussed the adverse outcomes associated with untreated sleep-disordered breathing.  We discussed treatment modalities of obstructive sleep apnea including CPAP device as well as oral mandibular advancement device. Sleep study will be scheduled to establish definitive diagnosis of sleep disorder breathing.  Weight loss will be strongly beneficial in order to reduce the severity of sleep-disordered breathing.  Patient has narrow oropharyngeal structure.  Caution during activities that require prolonged concentration is strongly advised.  Patient will be notified of sleep study results after sleep study is completed.  If sleep apnea is only mild,  oral mandibular advancement device may be one of the treatment options.  However if sleep apnea is moderately severe, CPAP treatment will be strongly encouraged.  " The patient is not opposed to treatment with CPAP device if we confirm significant obstructive sleep apnea on polysomnography.     She does report sensation of muscle weakness when she laughs hard or angry. If HST is negative for LANDY, consider in lab PSG+MSLT+UDS for evaluation of narcolepsy.    Thank you for allowing me to participate in your patient's care.    The patient will follow up with Dr. Colbert after completion of HST.    SAMY Torres  [facility]  Phone: [phone]      Part of this note may be an electronic transcription/translation of spoken language to printed text using the Dragon Dictation System. Some errors may exist even though the document was edited.

## 2019-07-18 DIAGNOSIS — R73.03 PREDIABETES: ICD-10-CM

## 2019-07-18 RX ORDER — METFORMIN HYDROCHLORIDE 500 MG/1
TABLET, EXTENDED RELEASE ORAL
Qty: 90 TABLET | Refills: 0 | Status: SHIPPED | OUTPATIENT
Start: 2019-07-18 | End: 2019-08-22 | Stop reason: SDUPTHER

## 2019-08-12 ENCOUNTER — HOSPITAL ENCOUNTER (OUTPATIENT)
Dept: SLEEP MEDICINE | Facility: HOSPITAL | Age: 29
End: 2019-08-12

## 2019-08-20 ENCOUNTER — LAB (OUTPATIENT)
Dept: LAB | Facility: HOSPITAL | Age: 29
End: 2019-08-20

## 2019-08-20 ENCOUNTER — CONSULT (OUTPATIENT)
Dept: ONCOLOGY | Facility: CLINIC | Age: 29
End: 2019-08-20

## 2019-08-20 VITALS
SYSTOLIC BLOOD PRESSURE: 134 MMHG | WEIGHT: 293 LBS | TEMPERATURE: 98.8 F | DIASTOLIC BLOOD PRESSURE: 85 MMHG | HEIGHT: 67 IN | RESPIRATION RATE: 16 BRPM | BODY MASS INDEX: 45.99 KG/M2 | HEART RATE: 85 BPM | OXYGEN SATURATION: 98 %

## 2019-08-20 DIAGNOSIS — D75.839 THROMBOCYTOSIS: Primary | ICD-10-CM

## 2019-08-20 DIAGNOSIS — D72.820 LYMPHOCYTOSIS: ICD-10-CM

## 2019-08-20 LAB
BASOPHILS # BLD AUTO: 0.08 10*3/MM3 (ref 0–0.2)
BASOPHILS NFR BLD AUTO: 0.6 % (ref 0–1.5)
DEPRECATED RDW RBC AUTO: 45.1 FL (ref 37–54)
EOSINOPHIL # BLD AUTO: 0.13 10*3/MM3 (ref 0–0.4)
EOSINOPHIL NFR BLD AUTO: 1.1 % (ref 0.3–6.2)
ERYTHROCYTE [DISTWIDTH] IN BLOOD BY AUTOMATED COUNT: 13.8 % (ref 12.3–15.4)
FERRITIN SERPL-MCNC: 57 NG/ML (ref 11–207)
HCT VFR BLD AUTO: 40.2 % (ref 34–46.6)
HGB BLD-MCNC: 13.3 G/DL (ref 12–15.9)
IMM GRANULOCYTES # BLD AUTO: 0.05 10*3/MM3 (ref 0–0.05)
IMM GRANULOCYTES NFR BLD AUTO: 0.4 % (ref 0–0.5)
IRON 24H UR-MRATE: 63 MCG/DL (ref 37–145)
IRON SATN MFR SERPL: 17 % (ref 14–48)
LDH SERPL-CCNC: 138 U/L (ref 99–259)
LYMPHOCYTES # BLD AUTO: 5.48 10*3/MM3 (ref 0.7–3.1)
LYMPHOCYTES NFR BLD AUTO: 44.5 % (ref 19.6–45.3)
MCH RBC QN AUTO: 29.7 PG (ref 26.6–33)
MCHC RBC AUTO-ENTMCNC: 33.1 G/DL (ref 31.5–35.7)
MCV RBC AUTO: 89.7 FL (ref 79–97)
MONOCYTES # BLD AUTO: 0.78 10*3/MM3 (ref 0.1–0.9)
MONOCYTES NFR BLD AUTO: 6.3 % (ref 5–12)
NEUTROPHILS # BLD AUTO: 5.8 10*3/MM3 (ref 1.7–7)
NEUTROPHILS NFR BLD AUTO: 47.1 % (ref 42.7–76)
NRBC BLD AUTO-RTO: 0 /100 WBC (ref 0–0.2)
PLATELET # BLD AUTO: 440 10*3/MM3 (ref 140–450)
PMV BLD AUTO: 9.9 FL (ref 6–12)
RBC # BLD AUTO: 4.48 10*6/MM3 (ref 3.77–5.28)
TIBC SERPL-MCNC: 365 MCG/DL (ref 249–505)
TRANSFERRIN SERPL-MCNC: 261 MG/DL (ref 200–360)
WBC NRBC COR # BLD: 12.32 10*3/MM3 (ref 3.4–10.8)

## 2019-08-20 PROCEDURE — 85025 COMPLETE CBC W/AUTO DIFF WBC: CPT

## 2019-08-20 PROCEDURE — 83540 ASSAY OF IRON: CPT | Performed by: INTERNAL MEDICINE

## 2019-08-20 PROCEDURE — 99244 OFF/OP CNSLTJ NEW/EST MOD 40: CPT | Performed by: INTERNAL MEDICINE

## 2019-08-20 PROCEDURE — 82728 ASSAY OF FERRITIN: CPT | Performed by: INTERNAL MEDICINE

## 2019-08-20 PROCEDURE — 36415 COLL VENOUS BLD VENIPUNCTURE: CPT

## 2019-08-20 PROCEDURE — 84466 ASSAY OF TRANSFERRIN: CPT | Performed by: INTERNAL MEDICINE

## 2019-08-20 PROCEDURE — 83615 LACTATE (LD) (LDH) ENZYME: CPT | Performed by: INTERNAL MEDICINE
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
## 2019-08-22 ENCOUNTER — OFFICE VISIT (OUTPATIENT)
Dept: FAMILY MEDICINE CLINIC | Facility: CLINIC | Age: 29
End: 2019-08-22

## 2019-08-22 VITALS
BODY MASS INDEX: 45.99 KG/M2 | DIASTOLIC BLOOD PRESSURE: 80 MMHG | SYSTOLIC BLOOD PRESSURE: 128 MMHG | WEIGHT: 293 LBS | TEMPERATURE: 98.5 F | HEIGHT: 67 IN

## 2019-08-22 DIAGNOSIS — R73.03 PREDIABETES: ICD-10-CM

## 2019-08-22 DIAGNOSIS — F32.A ANXIETY AND DEPRESSION: ICD-10-CM

## 2019-08-22 DIAGNOSIS — R53.82 CHRONIC FATIGUE: ICD-10-CM

## 2019-08-22 DIAGNOSIS — E66.01 CLASS 3 SEVERE OBESITY DUE TO EXCESS CALORIES WITHOUT SERIOUS COMORBIDITY WITH BODY MASS INDEX (BMI) OF 45.0 TO 49.9 IN ADULT (HCC): Primary | ICD-10-CM

## 2019-08-22 DIAGNOSIS — F41.9 ANXIETY AND DEPRESSION: ICD-10-CM

## 2019-08-22 LAB
REF LAB TEST METHOD: NORMAL
REF LAB TEST METHOD: NORMAL

## 2019-08-22 PROCEDURE — 99214 OFFICE O/P EST MOD 30 MIN: CPT | Performed by: FAMILY MEDICINE

## 2019-08-22 RX ORDER — METFORMIN HYDROCHLORIDE 500 MG/1
500 TABLET, EXTENDED RELEASE ORAL
Qty: 90 TABLET | Refills: 1 | Status: SHIPPED | OUTPATIENT
Start: 2019-08-22 | End: 2019-12-17 | Stop reason: SDUPTHER

## 2019-08-22 NOTE — PROGRESS NOTES
"SUBJECTIVE:  Chief Complaint   Patient presents with   • Establish Care     NP to [facility]   • Diabetes   • Med Refill       29 y.o. female for follow up of diabetes.   She has prediabetes  She is tired she needs to stay awake for work and school.   She is not a great sleeper. Says she sleeps off and on.   She was prescribed medication to take for sleep by someone else Dr. Oleary but phsycian retired. She quit those medication lexapro and risperidone. Was taking for 3-4 months. Didn't see a lot of change. She would like a counselor for talk therapy, anxiety and depression.   She wants to try caffeine pills.   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     Current Outpatient Medications   Medication Sig Dispense Refill   • metFORMIN ER (GLUCOPHAGE-XR) 500 MG 24 hr tablet Take 1 tablet by mouth Daily With Dinner. 90 tablet 1     No current facility-administered medications for this visit.        OBJECTIVE:      Review of Systems   Constitutional: Negative for activity change, appetite change and unexpected weight change.   Respiratory: Negative for shortness of breath.    Cardiovascular: Negative for chest pain and leg swelling.   Gastrointestinal: Positive for diarrhea. Negative for blood in stool and constipation.       Vitals:    08/22/19 1503   BP: 128/80   BP Location: Left arm   Patient Position: Sitting   Cuff Size: Adult   Temp: 98.5 °F (36.9 °C)   TempSrc: Oral   Weight: (!) 144 kg (318 lb 4.8 oz)   Height: 170 cm (66.93\")       Objective   Physical Exam   Constitutional: She is oriented to person, place, and time. She appears well-nourished. No distress.   HENT:   Right Ear: External ear normal.   Left Ear: External ear normal.   Nose: Nose normal.   Mouth/Throat: Oropharynx is clear and moist.   TMs and canals are clear, normal.   Eyes: Conjunctivae are normal. Right eye exhibits no discharge. Left eye exhibits no discharge. No scleral icterus.   Neck: Neck supple. No thyromegaly present. "   Cardiovascular: Normal rate, regular rhythm, normal heart sounds and intact distal pulses.   No murmur heard.  Pulmonary/Chest: Effort normal and breath sounds normal. No respiratory distress. She has no wheezes.   Musculoskeletal: She exhibits no edema.   Lymphadenopathy:     She has no cervical adenopathy.   Neurological: She is alert and oriented to person, place, and time. She exhibits normal muscle tone.   Psychiatric: She has a normal mood and affect. Her behavior is normal.   Vitals reviewed.      Assessment/Plan   Franny was seen today for establish care, diabetes and med refill.    Diagnoses and all orders for this visit:    Class 3 severe obesity due to excess calories without serious comorbidity with body mass index (BMI) of 45.0 to 49.9 in adult (CMS/[facility])    Prediabetes  -     metFORMIN ER (GLUCOPHAGE-XR) 500 MG 24 hr tablet; Take 1 tablet by mouth Daily With Dinner.  -     Hemoglobin A1c  -     Comprehensive Metabolic Panel    Chronic fatigue      Advised against the caffeine pills related to elevated heart rate and blood pressure potential.     Recommended against marijuana use because it can effect her sleep.     We will check labs.   Reviewed her note from hematology, ongoing workup for the elevated platelets.     She is trying to work out more, loves walking and trying to work more on her diet, finds this really hard. Encouraged her to pay attention to what causes the diarrhea.     She is going to follow up in 4 months.     Given list of counselors to contact.     Talked to her about smoking cessation but she is not interested in quitting at this time, not desired.

## 2019-08-27 LAB — REF LAB TEST METHOD: NORMAL

## 2019-08-28 LAB — REF LAB TEST METHOD: NORMAL

## 2019-09-03 ENCOUNTER — TELEPHONE (OUTPATIENT)
Dept: ONCOLOGY | Facility: CLINIC | Age: 29
End: 2019-09-03

## 2019-09-03 NOTE — TELEPHONE ENCOUNTER
Called and left message for the patient to call the office back to inform her that the lab test showed iron deficiency. Please ask the patient to start OTC Ferrous Sulfate 325 mg a day.  Rest of the labs were normal per Dr. Hanson.    ----- Message from Pop Hanson MD sent at 8/30/2019  7:40 PM EDT -----  Please inform the patient that the lab test showed iron deficiency. Please ask the patient to start OTC Ferrous sulfate 325 mg a day. Rest of labs were normal.     Thank you.

## 2019-09-04 ENCOUNTER — TELEPHONE (OUTPATIENT)
Dept: ONCOLOGY | Facility: CLINIC | Age: 29
End: 2019-09-04

## 2019-09-04 NOTE — TELEPHONE ENCOUNTER
Called and inform the patient that the lab test showed iron deficiency.  Please ask the patient to start OTC Ferrous sulfate 325 mg a day.  Rest of labs were normal per Dr. Hanson. Pt v/u      ----- Message from Pop Hanson MD sent at 8/30/2019  7:40 PM EDT -----  Please inform the patient that the lab test showed iron deficiency. Please ask the patient to start OTC Ferrous sulfate 325 mg a day. Rest of labs were normal.     Thank you.

## 2019-09-09 ENCOUNTER — HOSPITAL ENCOUNTER (OUTPATIENT)
Dept: GENERAL RADIOLOGY | Facility: HOSPITAL | Age: 29
Discharge: HOME | End: 2019-09-09
Admitting: FAMILY MEDICINE

## 2019-09-09 ENCOUNTER — OFFICE VISIT (OUTPATIENT)
Dept: FAMILY MEDICINE CLINIC | Facility: CLINIC | Age: 29
End: 2019-09-09

## 2019-09-09 VITALS
HEART RATE: 82 BPM | BODY MASS INDEX: 45.99 KG/M2 | SYSTOLIC BLOOD PRESSURE: 124 MMHG | HEIGHT: 67 IN | TEMPERATURE: 98.3 F | WEIGHT: 293 LBS | DIASTOLIC BLOOD PRESSURE: 70 MMHG | OXYGEN SATURATION: 97 % | RESPIRATION RATE: 18 BRPM

## 2019-09-09 DIAGNOSIS — M79.89 RIGHT LEG SWELLING: ICD-10-CM

## 2019-09-09 DIAGNOSIS — M79.604 RIGHT LEG PAIN: ICD-10-CM
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
## 2019-09-09 PROCEDURE — 99214 OFFICE O/P EST MOD 30 MIN: CPT | Performed by: FAMILY MEDICINE

## 2019-09-09 PROCEDURE — 73590 X-RAY EXAM OF LOWER LEG: CPT
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
## 2019-12-04 ENCOUNTER — OFFICE VISIT (OUTPATIENT)
Dept: FAMILY MEDICINE CLINIC | Facility: CLINIC | Age: 29
End: 2019-12-04

## 2019-12-04 VITALS
TEMPERATURE: 101.9 F | BODY MASS INDEX: 45.99 KG/M2 | DIASTOLIC BLOOD PRESSURE: 80 MMHG | SYSTOLIC BLOOD PRESSURE: 132 MMHG | HEART RATE: 101 BPM | WEIGHT: 293 LBS | HEIGHT: 67 IN | OXYGEN SATURATION: 98 % | RESPIRATION RATE: 26 BRPM

## 2019-12-04 DIAGNOSIS — R50.9 FEVER, UNSPECIFIED FEVER CAUSE: Primary | ICD-10-CM

## 2019-12-04 LAB
EXPIRATION DATE: NORMAL
FLUAV AG NPH QL: NEGATIVE
FLUBV AG NPH QL: NEGATIVE
INTERNAL CONTROL: NORMAL
Lab: NORMAL

## 2019-12-04 PROCEDURE — 99214 OFFICE O/P EST MOD 30 MIN: CPT | Performed by: NURSE PRACTITIONER

## 2019-12-04 PROCEDURE — 87804 INFLUENZA ASSAY W/OPTIC: CPT | Performed by: NURSE PRACTITIONER

## 2019-12-04 RX ORDER — METHOCARBAMOL 500 MG/1
TABLET, FILM COATED ORAL
Refills: 0 | COMMUNITY
Start: 2019-09-09 | End: 2019-12-17

## 2019-12-04 RX ORDER — OSELTAMIVIR PHOSPHATE 75 MG/1
75 CAPSULE ORAL 2 TIMES DAILY
Qty: 10 CAPSULE | Refills: 0 | Status: CANCELLED | OUTPATIENT
Start: 2019-12-04 | End: 2019-12-09

## 2019-12-04 RX ORDER — IBUPROFEN 800 MG/1
TABLET ORAL
Refills: 0 | COMMUNITY
Start: 2019-09-09 | End: 2019-12-17

## 2019-12-04 NOTE — PROGRESS NOTES
Subjective   Franny Boudreaux is a 29 y.o. female.     Chief Complaint   Patient presents with   • Headache   • Neck Pain   • Fever   • Fatigue      HPI  New patient to me.  Here for 1 day of severe HA, neck pain, fever, sore throat that began last night.  Went to work today and worsened over course of day. Says she feels terrible.   HA is across her whole head and has not resolved with OTC pain relievers.  Not sure if this feels different than other HA she has had in past. She also has neck pain but has had neck pain x 1 yr.  Is not able to tell if this neck pain is different than usual.  It is worse than normal.  Unsure sick contacts. Requesting some pain medication for headache.           Social History     Tobacco Use   • Smoking status: Current Every Day Smoker     Packs/day: 0.25     Years: 15.00     Pack years: 3.75     Types: Cigarettes     Start date: 1/1/2007   • Smokeless tobacco: Never Used   Substance Use Topics   • Alcohol use: Yes     Comment: 1 drink/month, not anymore   • Drug use: Yes     Types: Marijuana     Comment: does for therapy uses for focus and relaxation       The following portions of the patient's history were reviewed and updated as appropriate: allergies, current medications, past family history, past medical history, past social history, past surgical history and problem list.    Review of Systems   Constitutional: Positive for appetite change, chills, fatigue and fever.   HENT: Positive for sore throat. Negative for congestion, ear pain, rhinorrhea and trouble swallowing.    Eyes: Negative for pain and visual disturbance.   Respiratory: Negative for cough, shortness of breath and wheezing.    Cardiovascular: Negative for chest pain and palpitations.   Gastrointestinal: Positive for nausea. Negative for abdominal pain, constipation, diarrhea and vomiting.   Endocrine: Negative for polydipsia and polyuria.   Genitourinary: Negative for difficulty urinating, dysuria, frequency, hematuria  "and urgency.   Musculoskeletal: Positive for arthralgias, myalgias, neck pain and neck stiffness.   Skin: Negative for rash and wound.   Neurological: Negative for dizziness, seizures, facial asymmetry, speech difficulty, weakness, light-headedness and numbness.   Hematological: Negative for adenopathy.       Objective   Blood pressure 132/80, pulse 101, temperature (!) 101.9 °F (38.8 °C), temperature source Oral, resp. rate 26, height 170 cm (66.93\"), weight (!) 147 kg (323 lb), SpO2 98 %, not currently breastfeeding.  Body mass index is 50.69 kg/m².    Physical Exam   Constitutional: She is oriented to person, place, and time. She appears well-developed and well-nourished. No distress.   Sick appearing-but does not appear to be toxic  Slept in exam room for just over an hour and verbalized feeling better as she was leaving   HENT:   Head: Normocephalic and atraumatic.   Right Ear: Tympanic membrane, external ear and ear canal normal.   Left Ear: Tympanic membrane, external ear and ear canal normal.   Mouth/Throat: Posterior oropharyngeal erythema present. No tonsillar exudate.   Eyes: Conjunctivae and EOM are normal. Pupils are equal, round, and reactive to light. Right eye exhibits no discharge. Left eye exhibits no discharge.   Neck: Neck supple.   Cardiovascular: Normal rate and regular rhythm.   Pulmonary/Chest: Effort normal and breath sounds normal.   Abdominal: Soft. Bowel sounds are normal. There is no tenderness.   Musculoskeletal: She exhibits no deformity.   Gait smooth and steady   Lymphadenopathy:     She has cervical adenopathy (right anterior cervical).   Neurological: She is alert and oriented to person, place, and time. No sensory deficit. She exhibits normal muscle tone. Coordination normal.   Unable to perform complete neuro exam due to patient refusal to cooperate-feeling poorly   Skin: Skin is warm and dry. No rash noted. She is not diaphoretic.   Psychiatric: She has a normal mood and affect. "   Nursing note and vitals reviewed.      Assessment   Problem List Items Addressed This Visit     None      Visit Diagnoses     Fever, unspecified fever cause    -  Primary    Relevant Orders    POC Influenza A / B (Completed)           Procedures      Patient given 2 tylenol at appr 1400.            Impression and Plan:  Flu negative. Does not appear to have strep. No source of infection found-may be virus or false negative flu test.  Since I am not able to get good history and am not familiar with this patient or her baseline I think she needs to be evaluated in ER for HA, neck pain in setting of flu negative test and fever. Pt is agreeable with this.  Her sister is working across the street and will transport her.  She does note that she is feeling better after sleeping in exam room for awhile and tylenol given here, although her fever did not go down. She declined EMS transport.     Health Maintenance Due   Topic Date Due   • PNEUMOCOCCAL VACCINE (19-64 MEDIUM RISK) (1 of 1 - PPSV23) 07/03/2008   • TDAP/TD VACCINES (1 - Tdap) 07/03/2008              EMR Dragon/Transcription disclaimer:   Much of this encounter note is an electronic transcription/translation of spoken language to printed text. The electronic translation of spoken language may permit erroneous, or at times, nonsensical words or phrases to be inadvertently transcribed; Although I have reviewed the note for such errors, some may still exist.

## 2019-12-17 ENCOUNTER — OFFICE VISIT (OUTPATIENT)
Dept: FAMILY MEDICINE CLINIC | Facility: CLINIC | Age: 29
End: 2019-12-17

## 2019-12-17 VITALS
DIASTOLIC BLOOD PRESSURE: 86 MMHG | TEMPERATURE: 98.4 F | HEIGHT: 67 IN | BODY MASS INDEX: 45.99 KG/M2 | RESPIRATION RATE: 14 BRPM | SYSTOLIC BLOOD PRESSURE: 122 MMHG | WEIGHT: 293 LBS | HEART RATE: 86 BPM | OXYGEN SATURATION: 98 %

## 2019-12-17 DIAGNOSIS — E66.01 CLASS 3 SEVERE OBESITY DUE TO EXCESS CALORIES WITHOUT SERIOUS COMORBIDITY WITH BODY MASS INDEX (BMI) OF 45.0 TO 49.9 IN ADULT (HCC): ICD-10-CM

## 2019-12-17 DIAGNOSIS — E61.1 IRON DEFICIENCY: ICD-10-CM

## 2019-12-17 DIAGNOSIS — R73.03 PREDIABETES: Primary | ICD-10-CM

## 2019-12-17 PROCEDURE — 99214 OFFICE O/P EST MOD 30 MIN: CPT | Performed by: FAMILY MEDICINE

## 2019-12-17 RX ORDER — METFORMIN HYDROCHLORIDE 500 MG/1
500 TABLET, EXTENDED RELEASE ORAL
Qty: 90 TABLET | Refills: 1 | Status: SHIPPED | OUTPATIENT
Start: 2019-12-17 | End: 2020-09-10

## 2019-12-17 RX ORDER — BUPROPION HYDROCHLORIDE 150 MG/1
150 TABLET ORAL DAILY
Qty: 90 TABLET | Refills: 0 | Status: SHIPPED | OUTPATIENT
Start: 2019-12-17 | End: 2020-03-16
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
## 2019-12-18 ENCOUNTER — TELEPHONE (OUTPATIENT)
Dept: FAMILY MEDICINE CLINIC | Facility: CLINIC | Age: 29
End: 2019-12-18

## 2019-12-18 LAB
ALBUMIN SERPL-MCNC: 4.6 G/DL (ref 3.5–5.2)
ALBUMIN/GLOB SERPL: 1.3 G/DL
ALP SERPL-CCNC: 52 U/L (ref 39–117)
ALT SERPL-CCNC: 45 U/L (ref 1–33)
AST SERPL-CCNC: 31 U/L (ref 1–32)
BASOPHILS # BLD AUTO: 0.09 10*3/MM3 (ref 0–0.2)
BASOPHILS NFR BLD AUTO: 0.7 % (ref 0–1.5)
BILIRUB SERPL-MCNC: 0.4 MG/DL (ref 0.2–1.2)
BUN SERPL-MCNC: 6 MG/DL (ref 6–20)
BUN/CREAT SERPL: 6.2 (ref 7–25)
CALCIUM SERPL-MCNC: 9.4 MG/DL (ref 8.6–10.5)
CHLORIDE SERPL-SCNC: 100 MMOL/L (ref 98–107)
CO2 SERPL-SCNC: 24.9 MMOL/L (ref 22–29)
CREAT SERPL-MCNC: 0.97 MG/DL (ref 0.57–1)
EOSINOPHIL # BLD AUTO: 0.1 10*3/MM3 (ref 0–0.4)
EOSINOPHIL NFR BLD AUTO: 0.8 % (ref 0.3–6.2)
ERYTHROCYTE [DISTWIDTH] IN BLOOD BY AUTOMATED COUNT: 13.6 % (ref 12.3–15.4)
FERRITIN SERPL-MCNC: 145 NG/ML (ref 13–150)
GLOBULIN SER CALC-MCNC: 3.5 GM/DL
GLUCOSE SERPL-MCNC: 87 MG/DL (ref 65–99)
HBA1C MFR BLD: 6.7 % (ref 4.8–5.6)
HCT VFR BLD AUTO: 41 % (ref 34–46.6)
HGB BLD-MCNC: 13.3 G/DL (ref 12–15.9)
IMM GRANULOCYTES # BLD AUTO: 0.05 10*3/MM3 (ref 0–0.05)
IMM GRANULOCYTES NFR BLD AUTO: 0.4 % (ref 0–0.5)
IRON SATN MFR SERPL: 25 % (ref 20–50)
IRON SERPL-MCNC: 94 MCG/DL (ref 37–145)
LYMPHOCYTES # BLD AUTO: 4.51 10*3/MM3 (ref 0.7–3.1)
LYMPHOCYTES NFR BLD AUTO: 35.8 % (ref 19.6–45.3)
MCH RBC QN AUTO: 29.7 PG (ref 26.6–33)
MCHC RBC AUTO-ENTMCNC: 32.4 G/DL (ref 31.5–35.7)
MCV RBC AUTO: 91.5 FL (ref 79–97)
MONOCYTES # BLD AUTO: 0.69 10*3/MM3 (ref 0.1–0.9)
MONOCYTES NFR BLD AUTO: 5.5 % (ref 5–12)
NEUTROPHILS # BLD AUTO: 7.16 10*3/MM3 (ref 1.7–7)
NEUTROPHILS NFR BLD AUTO: 56.8 % (ref 42.7–76)
NRBC BLD AUTO-RTO: 0 /100 WBC (ref 0–0.2)
PLATELET # BLD AUTO: 555 10*3/MM3 (ref 140–450)
POTASSIUM SERPL-SCNC: 4.6 MMOL/L (ref 3.5–5.2)
PROT SERPL-MCNC: 8.1 G/DL (ref 6–8.5)
RBC # BLD AUTO: 4.48 10*6/MM3 (ref 3.77–5.28)
SODIUM SERPL-SCNC: 141 MMOL/L (ref 136–145)
TIBC SERPL-MCNC: 378 MCG/DL
UIBC SERPL-MCNC: 284 MCG/DL (ref 112–346)
WBC # BLD AUTO: 12.6 10*3/MM3 (ref 3.4–10.8)

## 2020-01-04 ENCOUNTER — APPOINTMENT (OUTPATIENT)
Dept: GENERAL RADIOLOGY | Facility: HOSPITAL | Age: 30
End: 2020-01-04

## 2020-01-04 PROCEDURE — 99282 EMERGENCY DEPT VISIT SF MDM: CPT

## 2020-01-04 PROCEDURE — 71046 X-RAY EXAM CHEST 2 VIEWS: CPT

## 2020-01-05 ENCOUNTER — HOSPITAL ENCOUNTER (EMERGENCY)
Facility: HOSPITAL | Age: 30
Discharge: HOME | End: 2020-01-05
Attending: EMERGENCY MEDICINE | Admitting: EMERGENCY MEDICINE

## 2020-01-05 VITALS
TEMPERATURE: 98 F | HEIGHT: 66 IN | RESPIRATION RATE: 16 BRPM | SYSTOLIC BLOOD PRESSURE: 127 MMHG | OXYGEN SATURATION: 98 % | HEART RATE: 91 BPM | BODY MASS INDEX: 47.09 KG/M2 | DIASTOLIC BLOOD PRESSURE: 85 MMHG | WEIGHT: 293 LBS

## 2020-01-05 DIAGNOSIS — R05.9 COUGH: Primary | ICD-10-CM

## 2020-01-05 RX ORDER — BENZONATATE 200 MG/1
200 CAPSULE ORAL 3 TIMES DAILY PRN
Qty: 21 CAPSULE | Refills: 0 | Status: SHIPPED | OUTPATIENT
Start: 2020-01-05 | End: 2020-10-01

## 2020-01-05 NOTE — ED TRIAGE NOTES
"Pt reports dry cough \"for months\" states sx's no worse tonight than they have been. Dry throat, no fever, no SOB. No obvious distress noted.  "

## 2020-01-05 NOTE — ED PROVIDER NOTES
EMERGENCY DEPARTMENT ENCOUNTER    CHIEF COMPLAINT  Chief Complaint: Cough  History given by: Patient  History limited by:   Room Number: 26/26  PMD: Gali Booth MD      HPI:  Pt is a 29 y.o. female who presents complaining of intermittent nonproductive cough that has been taking place for approximately 1 month. She also reports having an intermittent subjective fever since that time. She denies having some paroxysms of coughing. Denies inspiratory whoop or post-tussive emesis. She would like to be evaluated for PNA. She has taken Tyenol cold and sinus without improvement.       PAST MEDICAL HISTORY  Active Ambulatory Problems     Diagnosis Date Noted   • Class 3 severe obesity due to excess calories without serious comorbidity in adult (CMS/[facility]) 04/01/2019   • Abnormal uterine bleeding 04/01/2019   • Hirsutism 04/01/2019   • PCOS (polycystic ovarian syndrome) 04/01/2019   • Tobacco abuse 04/01/2019   • GERD (gastroesophageal reflux disease)    • Prediabetes    • Hyperlipidemia    • Thrombocytosis (CMS/[facility])    • Anxiety and depression 08/22/2019     Resolved Ambulatory Problems     Diagnosis Date Noted   • No Resolved Ambulatory Problems     Past Medical History:   Diagnosis Date   • Abnormal vaginal bleeding    • Anxiety    • Depression    • History of snoring        PAST SURGICAL HISTORY  Past Surgical History:   Procedure Laterality Date   • WISDOM TOOTH EXTRACTION         FAMILY HISTORY  Family History   Problem Relation Age of Onset   • Hypertension Maternal Grandmother    • Diabetes Maternal Grandmother    • Heart disease Maternal Grandmother    • Hypertension Mother    • Diabetes Mother    • Mental illness Mother    • Depression Father    • Schizophrenia Sister    • Breast cancer Neg Hx    • Ovarian cancer Neg Hx    • Uterine cancer Neg Hx    • Colon cancer Neg Hx        SOCIAL HISTORY  Social History     Socioeconomic History   • Marital status: Single     Spouse name: Not on file   • Number of children:  Not on file   • Years of education: College   • Highest education level: Not on file   Occupational History   • Occupation: Student/     Employer: [employer]   Tobacco Use   • Smoking status: Current Every Day Smoker     Packs/day: 0.25     Years: 15.00     Pack years: 3.75     Types: Cigarettes     Start date: 1/1/2007   • Smokeless tobacco: Never Used   Substance and Sexual Activity   • Alcohol use: Yes     Comment: 1 drink/month, not anymore   • Drug use: Yes     Types: Marijuana     Comment: does for therapy uses for focus and relaxation   • Sexual activity: Never   Social History Narrative    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  Lives at home with her sister.         ALLERGIES  Patient has no known allergies.    REVIEW OF SYSTEMS  Review of Systems   Constitutional: Positive for fever (subjective).   HENT: Negative for sore throat.    Eyes: Negative.    Respiratory: Positive for cough. Negative for shortness of breath.    Cardiovascular: Negative for chest pain.   Gastrointestinal: Negative for abdominal pain, diarrhea and vomiting.   Genitourinary: Negative for dysuria.   Musculoskeletal: Negative for neck pain.   Skin: Negative for rash.   Allergic/Immunologic: Negative.    Neurological: Negative for weakness, numbness and headaches.   Hematological: Negative.    Psychiatric/Behavioral: Negative.    All other systems reviewed and are negative.      PHYSICAL EXAM  ED Triage Vitals [01/04/20 2317]   Temp Heart Rate Resp BP SpO2   98 °F (36.7 °C) 107 16 -- 99 %      Temp src Heart Rate Source Patient Position BP Location FiO2 (%)   Tympanic -- -- -- --       Physical Exam   Constitutional: She is oriented to person, place, and time. No distress.   HENT:   Head: Normocephalic and atraumatic.   Eyes: Pupils are equal, round, and reactive to light. EOM are normal.   Neck: Normal range of motion. Neck supple.   Cardiovascular: Normal rate, regular rhythm and normal heart  sounds.   Pulmonary/Chest: Effort normal and breath sounds normal. No respiratory distress.   Abdominal: Soft. There is no tenderness. There is no rebound and no guarding.   Musculoskeletal: Normal range of motion. She exhibits no edema.   Neurological: She is alert and oriented to person, place, and time. She has normal sensation and normal strength.   Skin: Skin is warm and dry. No rash noted.   Psychiatric: Mood and affect normal.   Nursing note and vitals reviewed.      RADIOLOGY  XR Chest 2 View   Final Result   No acute findings.       This report was finalized on 1/5/2020 12:06 AM by Dr. Angeles Long M.D.               I ordered the above noted radiological studies. Interpreted by radiologist. Reviewed by me in PACS.       PROCEDURES  Procedures      PROGRESS AND CONSULTS     1:00 AM  Discussed with pt about her imaging result showing no PNA. Informed the pt of plan to discharge with anti-tussive. Pt understands and agrees with plan. All concerns were addressed.        MEDICAL DECISION MAKING  Results were reviewed/discussed with the patient and they were also made aware of online access. Pt also made aware that some labs, such as cultures, will not be resulted during ER visit and follow up with PMD is necessary.     MDM  Number of Diagnoses or Management Options  Cough:      Amount and/or Complexity of Data Reviewed  Tests in the radiology section of CPT®: reviewed and ordered (No PNA)           DIAGNOSIS  Final diagnoses:   Cough       DISPOSITION  DISCHARGE    Patient discharged in stable condition.    Reviewed implications of results, diagnosis, meds, responsibility to follow up, warning signs and symptoms of possible worsening, potential complications and reasons to return to ER.    Patient/Family voiced understanding of above instructions.    Discussed plan for discharge, as there is no emergent indication for admission. Patient referred to primary care provider for BP management due to today's  BP. Pt/family is agreeable and understands need for follow up and repeat testing.  Pt is aware that discharge does not mean that nothing is wrong but it indicates no emergency is present that requires admission and they must continue care with follow-up as given below or physician of their choice.     FOLLOW-UP  Gali Booth MD  [address] [id number] [phone]    Schedule an appointment as soon as possible for a visit in 2 days  even if well         Medication List      New Prescriptions    benzonatate 200 MG capsule  Commonly known as:  TESSALON  Take 1 capsule by mouth 3 (Three) Times a Day As Needed for Cough.              Latest Documented Vital Signs:  As of 1:04 AM  BP- 127/85 HR- 107 Temp- 98 °F (36.7 °C) (Tympanic) O2 sat- 99%    --  Documentation assistance provided by elvira Leach for Dr Burleson.  Information recorded by the scribe was done at my direction and has been verified and validated by me.     Margarito Leach  01/05/20 0104       Pawel Burleson MD  01/05/20 0147

## 2020-01-30 ENCOUNTER — TELEPHONE (OUTPATIENT)
Dept: FAMILY MEDICINE CLINIC | Facility: CLINIC | Age: 30
End: 2020-01-30

## 2020-03-16 RX ORDER — BUPROPION HYDROCHLORIDE 150 MG/1
TABLET ORAL
Qty: 90 TABLET | Refills: 0 | Status: SHIPPED | OUTPATIENT
Start: 2020-03-16 | End: 2020-06-11

## 2020-06-11 ENCOUNTER — OFFICE VISIT (OUTPATIENT)
Dept: FAMILY MEDICINE CLINIC | Facility: CLINIC | Age: 30
End: 2020-06-11

## 2020-06-11 VITALS
SYSTOLIC BLOOD PRESSURE: 120 MMHG | HEART RATE: 88 BPM | OXYGEN SATURATION: 95 % | RESPIRATION RATE: 16 BRPM | DIASTOLIC BLOOD PRESSURE: 68 MMHG | WEIGHT: 293 LBS | TEMPERATURE: 97.5 F | HEIGHT: 66 IN | BODY MASS INDEX: 47.09 KG/M2

## 2020-06-11 DIAGNOSIS — E66.01 CLASS 3 SEVERE OBESITY DUE TO EXCESS CALORIES WITHOUT SERIOUS COMORBIDITY WITH BODY MASS INDEX (BMI) OF 45.0 TO 49.9 IN ADULT (HCC): Primary | ICD-10-CM

## 2020-06-11 DIAGNOSIS — E11.9 TYPE 2 DIABETES MELLITUS WITHOUT COMPLICATION, WITHOUT LONG-TERM CURRENT USE OF INSULIN (HCC): ICD-10-CM

## 2020-06-11 LAB
ALBUMIN SERPL-MCNC: 4.6 G/DL (ref 3.5–5.2)
ALBUMIN/GLOB SERPL: 1.3 G/DL
ALP SERPL-CCNC: 49 U/L (ref 39–117)
ALT SERPL-CCNC: 23 U/L (ref 1–33)
AST SERPL-CCNC: 14 U/L (ref 1–32)
BASOPHILS # BLD AUTO: 0.06 10*3/MM3 (ref 0–0.2)
BASOPHILS NFR BLD AUTO: 0.8 % (ref 0–1.5)
BILIRUB SERPL-MCNC: 0.4 MG/DL (ref 0.2–1.2)
BUN SERPL-MCNC: 11 MG/DL (ref 6–20)
BUN/CREAT SERPL: 12.5 (ref 7–25)
CALCIUM SERPL-MCNC: 10 MG/DL (ref 8.6–10.5)
CHLORIDE SERPL-SCNC: 102 MMOL/L (ref 98–107)
CHOLEST SERPL-MCNC: 205 MG/DL (ref 0–200)
CO2 SERPL-SCNC: 27.9 MMOL/L (ref 22–29)
CREAT SERPL-MCNC: 0.88 MG/DL (ref 0.57–1)
EOSINOPHIL # BLD AUTO: 0.09 10*3/MM3 (ref 0–0.4)
EOSINOPHIL NFR BLD AUTO: 1.1 % (ref 0.3–6.2)
ERYTHROCYTE [DISTWIDTH] IN BLOOD BY AUTOMATED COUNT: 13.7 % (ref 12.3–15.4)
GLOBULIN SER CALC-MCNC: 3.5 GM/DL
GLUCOSE SERPL-MCNC: 105 MG/DL (ref 65–99)
HBA1C MFR BLD: 6.36 % (ref 4.8–5.6)
HCT VFR BLD AUTO: 40.6 % (ref 34–46.6)
HDLC SERPL-MCNC: 42 MG/DL (ref 40–60)
HGB BLD-MCNC: 13.1 G/DL (ref 12–15.9)
IMM GRANULOCYTES # BLD AUTO: 0.02 10*3/MM3 (ref 0–0.05)
IMM GRANULOCYTES NFR BLD AUTO: 0.3 % (ref 0–0.5)
LDLC SERPL CALC-MCNC: 139 MG/DL (ref 0–100)
LYMPHOCYTES # BLD AUTO: 3.62 10*3/MM3 (ref 0.7–3.1)
LYMPHOCYTES NFR BLD AUTO: 46.1 % (ref 19.6–45.3)
MCH RBC QN AUTO: 29.4 PG (ref 26.6–33)
MCHC RBC AUTO-ENTMCNC: 32.3 G/DL (ref 31.5–35.7)
MCV RBC AUTO: 91 FL (ref 79–97)
MONOCYTES # BLD AUTO: 0.63 10*3/MM3 (ref 0.1–0.9)
MONOCYTES NFR BLD AUTO: 8 % (ref 5–12)
NEUTROPHILS # BLD AUTO: 3.43 10*3/MM3 (ref 1.7–7)
NEUTROPHILS NFR BLD AUTO: 43.7 % (ref 42.7–76)
NRBC BLD AUTO-RTO: 0 /100 WBC (ref 0–0.2)
PLATELET # BLD AUTO: 563 10*3/MM3 (ref 140–450)
POTASSIUM SERPL-SCNC: 4.4 MMOL/L (ref 3.5–5.2)
PROT SERPL-MCNC: 8.1 G/DL (ref 6–8.5)
RBC # BLD AUTO: 4.46 10*6/MM3 (ref 3.77–5.28)
SODIUM SERPL-SCNC: 139 MMOL/L (ref 136–145)
TRIGL SERPL-MCNC: 121 MG/DL (ref 0–150)
TSH SERPL DL<=0.005 MIU/L-ACNC: 1.6 UIU/ML (ref 0.27–4.2)
VLDLC SERPL CALC-MCNC: 24.2 MG/DL
WBC # BLD AUTO: 7.85 10*3/MM3 (ref 3.4–10.8)

## 2020-06-11 PROCEDURE — 99214 OFFICE O/P EST MOD 30 MIN: CPT | Performed by: FAMILY MEDICINE

## 2020-06-11 RX ORDER — BUPROPION HYDROCHLORIDE 150 MG/1
TABLET ORAL
Qty: 90 TABLET | Refills: 0 | OUTPATIENT
Start: 2020-06-11

## 2020-06-11 NOTE — PROGRESS NOTES
"Subjective   Franny Boudreaux is a 29 y.o. female.     Chief Complaint   Patient presents with   • Follow-up     4 mon f/u    • Diabetes        History of Present Illness  Obesity, prediabetes, weight loss.   Says she is working out and going to the gym. She is doing mostly cardio and little bit of weight lifting. She walks and jumps rope. Does a little boxing. Some different things. She is doing at home workouts and doing videos on line. For her diet she has changed her eating habits. She eats 4 meals, quit bread. She eats fruits and vegetables. She does not count calories. First meal she has two eggs, piece of sausage, bagel. Then snack she has chicken wrap with avocado, wheat wrap and cheese. Yogurt for another snack and then dinner. She has dinner with meat and two sides, typically mashed potatoes, corn, gn beans, broccoli, sweet potatoes, brussel sprouts.   She works 7 days per week and she is going to school. Sleep is \"off and on\" usually asleep at 10-11 pm then back up at about 2 am. Back to sleep about 4 am. Does homework during that time.   She will be done 9/2020. Says she thinks it has been so easy.   She is still at [employer] and works at [employer] part time.   Says she is trying to get her whole family to join her, lives with 2 sisters, GM, brother. She is somewhat frustrated that she is working so hard but not seeing results.   She says the bupropion has not made a change to her mood or appetite. She feels like it has been worse for her mood, still having a hard time at work    The following portions of the patient's history were reviewed and updated as appropriate: allergies, current medications, past medical history, past social history and problem list.    Review of Systems   Constitutional: Negative for activity change, appetite change, diaphoresis and unexpected weight change.   Endocrine: Negative for polydipsia and polyuria.   Skin: Negative for wound.   Neurological: Negative for light-headedness. " "      Objective   /68   Pulse 88   Temp 97.5 °F (36.4 °C) (Temporal)   Resp 16   Ht 167.6 cm (66\")   Wt (!) 141 kg (310 lb 3.2 oz)   SpO2 95%   BMI 50.07 kg/m²   Physical Exam   Constitutional: She is oriented to person, place, and time. She appears well-nourished. No distress.   HENT:   Right Ear: External ear normal.   Left Ear: External ear normal.   Nose: Nose normal.   Mouth/Throat: Oropharynx is clear and moist.   TMs and canals are clear, normal.   Eyes: Conjunctivae are normal. Right eye exhibits no discharge. Left eye exhibits no discharge. No scleral icterus.   Neck: Neck supple. No thyromegaly present.   Cardiovascular: Normal rate, regular rhythm, normal heart sounds and intact distal pulses.   No murmur heard.  Pulmonary/Chest: Effort normal and breath sounds normal. No respiratory distress. She has no wheezes.   Musculoskeletal: She exhibits no edema.   Lymphadenopathy:     She has no cervical adenopathy.   Neurological: She is alert and oriented to person, place, and time. She exhibits normal muscle tone.   Psychiatric: She has a normal mood and affect. Her behavior is normal.   Vitals reviewed.      Assessment/Plan   Franny was seen today for follow-up and diabetes.    Diagnoses and all orders for this visit:    Class 3 severe obesity due to excess calories without serious comorbidity with body mass index (BMI) of 45.0 to 49.9 in adult (CMS/[facility])  -     CBC & Differential  -     Comprehensive Metabolic Panel  -     Lipid Panel  -     TSH    Type 2 diabetes mellitus without complication, without long-term current use of insulin (CMS/[facility])  -     CBC & Differential  -     Hemoglobin A1c  -     Comprehensive Metabolic Panel  -     Lipid Panel  -     TSH    Other orders  -     Liraglutide -Weight Management 18 MG/3ML solution pen-injector; Inject 0.6 mg SC for 1 wk then increase 0.6 mg every day per week      She is working hard given her report and in fact by our scale has lost 14 lbs. She " was very excited to hear this. Encouraged her to keep working out. We will try the saxenda. Not sure it will be approved, may have to try an alternative prior to this. She voiced understanding.   We need to check in on labs for her DM. She continued to take the metformin 500 mg once daily. No change yet. Will see what the lab is. She has made a lot of positive changes, exercising and has lost weight.   F/u in 3-6 months depending on results and course. Prn sooner.

## 2020-06-18 RX ORDER — PEN NEEDLE, DIABETIC 30 GX3/16"
NEEDLE, DISPOSABLE MISCELLANEOUS
Qty: 100 EACH | Refills: 2 | Status: SHIPPED | OUTPATIENT
Start: 2020-06-18 | End: 2020-10-07 | Stop reason: SDUPTHER

## 2020-08-07 ENCOUNTER — TELEPHONE (OUTPATIENT)
Dept: FAMILY MEDICINE CLINIC | Facility: CLINIC | Age: 30
End: 2020-08-07

## 2020-08-07 RX ORDER — BLOOD-GLUCOSE METER
EACH MISCELLANEOUS
Qty: 100 EACH | Refills: 12 | Status: SHIPPED | OUTPATIENT
Start: 2020-08-07

## 2020-08-07 RX ORDER — BLOOD-GLUCOSE METER
1 KIT MISCELLANEOUS DAILY
Qty: 1 EACH | Refills: 0 | Status: SHIPPED | OUTPATIENT
Start: 2020-08-07

## 2020-08-07 NOTE — TELEPHONE ENCOUNTER
Patient called and requested a refill for Liraglutide -Weight Management 18 MG/3ML solution pen-injector. Patient would like a 90 day supply if possible    Patient is also requesting a Rx for a One touch Flex Glucose meter with lacents and test strips     Please advise   [phone]     [facility]/pharmacy #[id number] - [place], [place] - [address]  AT [facility] - [phone]  - [phone] FX

## 2020-09-10 DIAGNOSIS — R73.03 PREDIABETES: ICD-10-CM

## 2020-09-10 RX ORDER — METFORMIN HYDROCHLORIDE 500 MG/1
TABLET, EXTENDED RELEASE ORAL
Qty: 90 TABLET | Refills: 1 | Status: SHIPPED | OUTPATIENT
Start: 2020-09-10

## 2020-09-22 ENCOUNTER — TELEPHONE (OUTPATIENT)
Dept: FAMILY MEDICINE CLINIC | Facility: CLINIC | Age: 30
End: 2020-09-22

## 2020-09-22 NOTE — TELEPHONE ENCOUNTER
She needs to come in very soon at a minimum for weight check and labs to ensure this medication is still appropriate to refill. Can be nurse and lab visit and then see me in December for 6 months. Thanks.

## 2020-09-22 NOTE — TELEPHONE ENCOUNTER
PATIENT IS CALLING NEEDING TO GET A MEDICATION REFILL ON Liraglutide -Weight Management 18 MG/3ML solution pen-injector      PATIENT IS OUT OF THIS MEDICATION      PATIENT NEEDS REFILL TO GO TO   PHARMACY:  [facility] [phone]          PLEASE CONTACT  @[phone]

## 2020-09-29 NOTE — TELEPHONE ENCOUNTER
Pt scheduled weight check and lab per Andria. She will call back to make her follow up in December.

## 2020-10-01 ENCOUNTER — CLINICAL SUPPORT (OUTPATIENT)
Dept: FAMILY MEDICINE CLINIC | Facility: CLINIC | Age: 30
End: 2020-10-01

## 2020-10-01 VITALS — BODY MASS INDEX: 48.55 KG/M2 | WEIGHT: 293 LBS

## 2020-10-01 DIAGNOSIS — D75.839 THROMBOCYTOSIS: ICD-10-CM

## 2020-10-01 DIAGNOSIS — E11.9 TYPE 2 DIABETES MELLITUS WITHOUT COMPLICATION, WITHOUT LONG-TERM CURRENT USE OF INSULIN (HCC): Primary | ICD-10-CM

## 2020-10-02 LAB
BUN SERPL-MCNC: 9 MG/DL (ref 6–20)
BUN/CREAT SERPL: 9.6 (ref 7–25)
CALCIUM SERPL-MCNC: 9.6 MG/DL (ref 8.6–10.5)
CHLORIDE SERPL-SCNC: 102 MMOL/L (ref 98–107)
CO2 SERPL-SCNC: 27.3 MMOL/L (ref 22–29)
CREAT SERPL-MCNC: 0.94 MG/DL (ref 0.57–1)
ERYTHROCYTE [DISTWIDTH] IN BLOOD BY AUTOMATED COUNT: 13.8 % (ref 12.3–15.4)
GLUCOSE SERPL-MCNC: 113 MG/DL (ref 65–99)
HBA1C MFR BLD: 5.8 % (ref 4.8–5.6)
HCT VFR BLD AUTO: 38.6 % (ref 34–46.6)
HGB BLD-MCNC: 12.5 G/DL (ref 12–15.9)
MCH RBC QN AUTO: 29.1 PG (ref 26.6–33)
MCHC RBC AUTO-ENTMCNC: 32.4 G/DL (ref 31.5–35.7)
MCV RBC AUTO: 89.8 FL (ref 79–97)
PLATELET # BLD AUTO: 606 10*3/MM3 (ref 140–450)
POTASSIUM SERPL-SCNC: 4.5 MMOL/L (ref 3.5–5.2)
RBC # BLD AUTO: 4.3 10*6/MM3 (ref 3.77–5.28)
SODIUM SERPL-SCNC: 141 MMOL/L (ref 136–145)
WBC # BLD AUTO: 9.65 10*3/MM3 (ref 3.4–10.8)

## 2020-10-06 RX ORDER — PEN NEEDLE, DIABETIC 30 GX3/16"
NEEDLE, DISPOSABLE MISCELLANEOUS
Qty: 100 EACH | Refills: 2 | Status: CANCELLED | OUTPATIENT
Start: 2020-10-06

## 2020-10-07 RX ORDER — PEN NEEDLE, DIABETIC 30 GX3/16"
NEEDLE, DISPOSABLE MISCELLANEOUS
Qty: 100 EACH | Refills: 2 | Status: SHIPPED | OUTPATIENT
Start: 2020-10-07

## 2020-10-13 DIAGNOSIS — R73.03 PREDIABETES: ICD-10-CM

## 2020-10-13 RX ORDER — METFORMIN HYDROCHLORIDE 500 MG/1
500 TABLET, EXTENDED RELEASE ORAL
Qty: 90 TABLET | Refills: 1 | OUTPATIENT
Start: 2020-10-13

## 2020-11-17 RX ORDER — LIRAGLUTIDE 6 MG/ML
INJECTION SUBCUTANEOUS
Qty: 27 ML | Refills: 2 | Status: SHIPPED | OUTPATIENT
Start: 2020-11-17

## 2021-03-05 ENCOUNTER — OFFICE VISIT (OUTPATIENT)
Dept: FAMILY MEDICINE CLINIC | Facility: CLINIC | Age: 31
End: 2021-03-05

## 2021-03-05 VITALS
BODY MASS INDEX: 47.09 KG/M2 | DIASTOLIC BLOOD PRESSURE: 72 MMHG | SYSTOLIC BLOOD PRESSURE: 148 MMHG | TEMPERATURE: 96.6 F | WEIGHT: 293 LBS | HEART RATE: 86 BPM | HEIGHT: 66 IN | OXYGEN SATURATION: 99 %

## 2021-03-05 DIAGNOSIS — G47.9 SLEEP DISTURBANCE: ICD-10-CM

## 2021-03-05 DIAGNOSIS — K92.1 BLOOD IN STOOL: ICD-10-CM

## 2021-03-05 DIAGNOSIS — R11.2 NON-INTRACTABLE VOMITING WITH NAUSEA, UNSPECIFIED VOMITING TYPE: Primary | ICD-10-CM

## 2021-03-05 DIAGNOSIS — L03.313 CELLULITIS OF CHEST WALL: ICD-10-CM

## 2021-03-05 PROBLEM — Z72.0 TOBACCO ABUSE: Status: RESOLVED | Noted: 2019-04-01 | Resolved: 2021-03-05

## 2021-03-05 PROCEDURE — 99214 OFFICE O/P EST MOD 30 MIN: CPT | Performed by: FAMILY MEDICINE

## 2021-03-05 RX ORDER — BLOOD SUGAR DIAGNOSTIC
STRIP MISCELLANEOUS
COMMUNITY

## 2021-03-05 RX ORDER — CEPHALEXIN 500 MG/1
500 CAPSULE ORAL 2 TIMES DAILY
Qty: 14 CAPSULE | Refills: 0 | Status: SHIPPED | OUTPATIENT
Start: 2021-03-05

## 2021-03-05 RX ORDER — BLOOD-GLUCOSE METER
EACH MISCELLANEOUS
COMMUNITY
Start: 2020-08-05

## 2021-03-05 RX ORDER — ONDANSETRON 4 MG/1
4 TABLET, ORALLY DISINTEGRATING ORAL EVERY 6 HOURS PRN
Qty: 20 TABLET | Refills: 0 | Status: SHIPPED | OUTPATIENT
Start: 2021-03-05

## 2021-03-05 NOTE — PROGRESS NOTES
"Chief Complaint  Sleeping Problem (wants referral for sleep study ), Nausea (Pt states that she has food poisoning for 3 days ), and Cyst (knot of breast right side )    Subjective          Franny Boudreaux presents to [facility]  History of Present Illness  Sleep study.   She is interested in doing this follow up now. She has had a sleep study before about one year ago. Did not follow through. She just met with the sleep doctors.   She is waking up choking. Going on for one month. Says coughing a lot when it happens. She says a lot of coughing and then can fall back to sleep.   No lightheadedness.   Just a dry cough.   No mucous.   Was seen by sleep in 6/2019 but was not having symptoms.     Cyst?  More than one week. On her chest  Was harder and larger but seems like it has gotten smaller.   Has not drained, not hot or red.   Just like a hard ball on the chest.   She just has this one area.     Nausea started three nights ago after eating sushi and salmon. Says she keeps throwing up. She is trying to eat but throws up.   She threw up last night.   She gets hot and cold.   She said bowel movement was two days ago and blood in the stool.   Blood in the bowel 2-3 tsp.   She has had before with hemorrhoids but this episode was diarrhea, loose, not watery, no straining.          Objective   Vital Signs:   /72 (BP Location: Left arm, Patient Position: Sitting, Cuff Size: Adult)   Pulse 86   Temp 96.6 °F (35.9 °C) (Infrared)   Ht 167.6 cm (66\")   Wt 136 kg (299 lb 4.8 oz)   SpO2 99%   BMI 48.31 kg/m²     Physical Exam  Vitals signs reviewed.   Constitutional:       General: She is not in acute distress.  Eyes:      General: No scleral icterus.        Right eye: No discharge.         Left eye: No discharge.      Conjunctiva/sclera: Conjunctivae normal.   Cardiovascular:      Rate and Rhythm: Normal rate and regular rhythm.      Heart sounds: Normal heart sounds. No murmur. "   Pulmonary:      Effort: Pulmonary effort is normal. No respiratory distress.      Breath sounds: Normal breath sounds. No wheezing.   Abdominal:      General: Bowel sounds are normal. There is no distension.      Palpations: Abdomen is soft.      Tenderness: There is no abdominal tenderness.   Skin:     Findings: Erythema present.      Comments: On the right inner breast surface there is some induration and very light erythema.  This area is warm and mildly tender.  There is no fluctuance or noted cyst below the surface of the skin that I can palpate in this area.   Neurological:      Mental Status: She is oriented to person, place, and time.      Motor: No abnormal muscle tone.   Psychiatric:         Behavior: Behavior normal.          Result Review :                 Assessment and Plan    Diagnoses and all orders for this visit:    1. Non-intractable vomiting with nausea, unspecified vomiting type (Primary)  -     Comprehensive Metabolic Panel  -     CBC & Differential  -     TSH    2. Blood in stool  -     CBC & Differential    3. Sleep disturbance  -     TSH  -     Ambulatory Referral to Sleep Medicine    4. Cellulitis of chest wall    Other orders  -     ondansetron ODT (ZOFRAN-ODT) 4 MG disintegrating tablet; Place 1 tablet on the tongue Every 6 (Six) Hours As Needed for Nausea or Vomiting.  Dispense: 20 tablet; Refill: 0  -     cephalexin (KEFLEX) 500 MG capsule; Take 1 capsule by mouth 2 (Two) Times a Day.  Dispense: 14 capsule; Refill: 0        Follow Up   No follow-ups on file.  Patient was given instructions and counseling regarding her condition or for health maintenance advice. Please see specific information pulled into the AVS if appropriate.     She appears to not feel well today though she is still joking around a little bit and laughing in the office.  She was encouraged to work on her fluid intake.  She was given a 4 mg dose of Zofran in the office which she tolerated.  I am sending more of that  to the pharmacy and recommended that she take slow small sips of water, Pedialyte, Gatorade, or broth to stay hydrated.  It is okay if she is not eating but she needs to be tolerating fluids.  Going to check in on some labs make sure there is no electrolyte derangement.  We did discuss that her symptoms could be related to Covid and that she should isolate herself.  I told her that if she was throwing up with fluids or she had more blood in the stool that she should go to the hospital over the weekend.  She is going to check back in with me on Monday if she does not have a need to go to the hospital and if she is not improving we are going to get a CT scan of her belly.  I am not sure that all of this is related to what appears to be a mild cellulitis of her right breast area.  I am going to go ahead and treat that with antibiotic which I told her might give her more upset stomach and move the bowels.  She voiced understanding.  We will also send her with cards to check the stools for blood unless she sees gross blood.    Patient initially come in for this appointment because she was concerned about some choking she is doing that wakes her up at night and she is concerned about sleep apnea.  I did send a referral for sleep study we will try to get her feeling better acutely here first.      I reiterated how important it is for her to go to the hospital if she has any worsening of her symptoms or more blood in the stool.  She voiced understanding.  She lives with her sister who will be able to help keep an eye on her this weekend.

## 2021-03-06 LAB
ALBUMIN SERPL-MCNC: 4.6 G/DL (ref 3.5–5.2)
ALBUMIN/GLOB SERPL: 1.2 G/DL
ALP SERPL-CCNC: 66 U/L (ref 39–117)
ALT SERPL-CCNC: 22 U/L (ref 1–33)
AST SERPL-CCNC: 16 U/L (ref 1–32)
BASOPHILS # BLD AUTO: 0.07 10*3/MM3 (ref 0–0.2)
BASOPHILS NFR BLD AUTO: 0.7 % (ref 0–1.5)
BILIRUB SERPL-MCNC: 0.7 MG/DL (ref 0–1.2)
BUN SERPL-MCNC: 6 MG/DL (ref 6–20)
BUN/CREAT SERPL: 6 (ref 7–25)
CALCIUM SERPL-MCNC: 9.9 MG/DL (ref 8.6–10.5)
CHLORIDE SERPL-SCNC: 98 MMOL/L (ref 98–107)
CO2 SERPL-SCNC: 27.6 MMOL/L (ref 22–29)
CREAT SERPL-MCNC: 1 MG/DL (ref 0.57–1)
EOSINOPHIL # BLD AUTO: 0.08 10*3/MM3 (ref 0–0.4)
EOSINOPHIL NFR BLD AUTO: 0.8 % (ref 0.3–6.2)
ERYTHROCYTE [DISTWIDTH] IN BLOOD BY AUTOMATED COUNT: 13.3 % (ref 12.3–15.4)
GLOBULIN SER CALC-MCNC: 3.9 GM/DL
GLUCOSE SERPL-MCNC: 98 MG/DL (ref 65–99)
HCT VFR BLD AUTO: 43.4 % (ref 34–46.6)
HGB BLD-MCNC: 14.5 G/DL (ref 12–15.9)
IMM GRANULOCYTES # BLD AUTO: 0.02 10*3/MM3 (ref 0–0.05)
IMM GRANULOCYTES NFR BLD AUTO: 0.2 % (ref 0–0.5)
LYMPHOCYTES # BLD AUTO: 3.06 10*3/MM3 (ref 0.7–3.1)
LYMPHOCYTES NFR BLD AUTO: 32.1 % (ref 19.6–45.3)
MCH RBC QN AUTO: 30.3 PG (ref 26.6–33)
MCHC RBC AUTO-ENTMCNC: 33.4 G/DL (ref 31.5–35.7)
MCV RBC AUTO: 90.6 FL (ref 79–97)
MONOCYTES # BLD AUTO: 0.76 10*3/MM3 (ref 0.1–0.9)
MONOCYTES NFR BLD AUTO: 8 % (ref 5–12)
NEUTROPHILS # BLD AUTO: 5.54 10*3/MM3 (ref 1.7–7)
NEUTROPHILS NFR BLD AUTO: 58.2 % (ref 42.7–76)
NRBC BLD AUTO-RTO: 0 /100 WBC (ref 0–0.2)
PLATELET # BLD AUTO: 640 10*3/MM3 (ref 140–450)
POTASSIUM SERPL-SCNC: 4.5 MMOL/L (ref 3.5–5.2)
PROT SERPL-MCNC: 8.5 G/DL (ref 6–8.5)
RBC # BLD AUTO: 4.79 10*6/MM3 (ref 3.77–5.28)
SODIUM SERPL-SCNC: 138 MMOL/L (ref 136–145)
TSH SERPL DL<=0.005 MIU/L-ACNC: 1.51 UIU/ML (ref 0.27–4.2)
WBC # BLD AUTO: 9.53 10*3/MM3 (ref 3.4–10.8)

## 2021-04-16 ENCOUNTER — BULK ORDERING (OUTPATIENT)
Dept: CASE MANAGEMENT | Facility: OTHER | Age: 31
End: 2021-04-16

## 2021-04-16 DIAGNOSIS — Z23 IMMUNIZATION DUE: ICD-10-CM
